# Patient Record
Sex: FEMALE | Race: WHITE | NOT HISPANIC OR LATINO | URBAN - METROPOLITAN AREA
[De-identification: names, ages, dates, MRNs, and addresses within clinical notes are randomized per-mention and may not be internally consistent; named-entity substitution may affect disease eponyms.]

---

## 2019-05-19 ENCOUNTER — INPATIENT (INPATIENT)
Facility: HOSPITAL | Age: 74
LOS: 1 days | Discharge: HOME | DRG: 69 | End: 2019-05-21
Attending: FAMILY MEDICINE | Admitting: FAMILY MEDICINE
Payer: MEDICARE

## 2019-05-19 VITALS
DIASTOLIC BLOOD PRESSURE: 69 MMHG | TEMPERATURE: 98 F | WEIGHT: 199.96 LBS | OXYGEN SATURATION: 96 % | HEART RATE: 72 BPM | RESPIRATION RATE: 14 BRPM | HEIGHT: 64 IN | SYSTOLIC BLOOD PRESSURE: 114 MMHG

## 2019-05-19 DIAGNOSIS — K58.9 IRRITABLE BOWEL SYNDROME WITHOUT DIARRHEA: ICD-10-CM

## 2019-05-19 DIAGNOSIS — E11.9 TYPE 2 DIABETES MELLITUS WITHOUT COMPLICATIONS: ICD-10-CM

## 2019-05-19 DIAGNOSIS — Z96.611 PRESENCE OF RIGHT ARTIFICIAL SHOULDER JOINT: Chronic | ICD-10-CM

## 2019-05-19 DIAGNOSIS — E03.9 HYPOTHYROIDISM, UNSPECIFIED: ICD-10-CM

## 2019-05-19 DIAGNOSIS — I10 ESSENTIAL (PRIMARY) HYPERTENSION: ICD-10-CM

## 2019-05-19 DIAGNOSIS — R53.1 WEAKNESS: ICD-10-CM

## 2019-05-19 DIAGNOSIS — Z29.9 ENCOUNTER FOR PROPHYLACTIC MEASURES, UNSPECIFIED: ICD-10-CM

## 2019-05-19 DIAGNOSIS — I63.9 CEREBRAL INFARCTION, UNSPECIFIED: ICD-10-CM

## 2019-05-19 DIAGNOSIS — Z98.891 HISTORY OF UTERINE SCAR FROM PREVIOUS SURGERY: Chronic | ICD-10-CM

## 2019-05-19 DIAGNOSIS — K21.9 GASTRO-ESOPHAGEAL REFLUX DISEASE WITHOUT ESOPHAGITIS: ICD-10-CM

## 2019-05-19 LAB
ALBUMIN SERPL ELPH-MCNC: 3.5 G/DL — SIGNIFICANT CHANGE UP (ref 3.3–5)
ALP SERPL-CCNC: 72 U/L — SIGNIFICANT CHANGE UP (ref 40–120)
ALT FLD-CCNC: 25 U/L — SIGNIFICANT CHANGE UP (ref 12–78)
AMMONIA BLD-MCNC: 29 UMOL/L — SIGNIFICANT CHANGE UP (ref 11–32)
ANION GAP SERPL CALC-SCNC: 7 MMOL/L — SIGNIFICANT CHANGE UP (ref 5–17)
APPEARANCE UR: CLEAR — SIGNIFICANT CHANGE UP
APTT BLD: 29.4 SEC — SIGNIFICANT CHANGE UP (ref 28.5–37)
AST SERPL-CCNC: 14 U/L — LOW (ref 15–37)
BILIRUB SERPL-MCNC: 0.2 MG/DL — SIGNIFICANT CHANGE UP (ref 0.2–1.2)
BILIRUB UR-MCNC: NEGATIVE — SIGNIFICANT CHANGE UP
BUN SERPL-MCNC: 39 MG/DL — HIGH (ref 7–23)
CALCIUM SERPL-MCNC: 8.8 MG/DL — SIGNIFICANT CHANGE UP (ref 8.5–10.1)
CHLORIDE SERPL-SCNC: 106 MMOL/L — SIGNIFICANT CHANGE UP (ref 96–108)
CO2 SERPL-SCNC: 29 MMOL/L — SIGNIFICANT CHANGE UP (ref 22–31)
COLOR SPEC: YELLOW — SIGNIFICANT CHANGE UP
CREAT SERPL-MCNC: 0.97 MG/DL — SIGNIFICANT CHANGE UP (ref 0.5–1.3)
DIFF PNL FLD: ABNORMAL
GLUCOSE SERPL-MCNC: 109 MG/DL — HIGH (ref 70–99)
GLUCOSE UR QL: NEGATIVE — SIGNIFICANT CHANGE UP
HCT VFR BLD CALC: 40.4 % — SIGNIFICANT CHANGE UP (ref 34.5–45)
HGB BLD-MCNC: 12.8 G/DL — SIGNIFICANT CHANGE UP (ref 11.5–15.5)
INR BLD: 0.97 RATIO — SIGNIFICANT CHANGE UP (ref 0.88–1.16)
KETONES UR-MCNC: NEGATIVE — SIGNIFICANT CHANGE UP
LEUKOCYTE ESTERASE UR-ACNC: ABNORMAL
MCHC RBC-ENTMCNC: 28.8 PG — SIGNIFICANT CHANGE UP (ref 27–34)
MCHC RBC-ENTMCNC: 31.7 GM/DL — LOW (ref 32–36)
MCV RBC AUTO: 90.8 FL — SIGNIFICANT CHANGE UP (ref 80–100)
NITRITE UR-MCNC: NEGATIVE — SIGNIFICANT CHANGE UP
NRBC # BLD: 0 /100 WBCS — SIGNIFICANT CHANGE UP (ref 0–0)
PH UR: 5 — SIGNIFICANT CHANGE UP (ref 5–8)
PLATELET # BLD AUTO: 200 K/UL — SIGNIFICANT CHANGE UP (ref 150–400)
POTASSIUM SERPL-MCNC: 4.2 MMOL/L — SIGNIFICANT CHANGE UP (ref 3.5–5.3)
POTASSIUM SERPL-SCNC: 4.2 MMOL/L — SIGNIFICANT CHANGE UP (ref 3.5–5.3)
PROT SERPL-MCNC: 6.8 G/DL — SIGNIFICANT CHANGE UP (ref 6–8.3)
PROT UR-MCNC: NEGATIVE — SIGNIFICANT CHANGE UP
PROTHROM AB SERPL-ACNC: 11.1 SEC — SIGNIFICANT CHANGE UP (ref 10–12.9)
RBC # BLD: 4.45 M/UL — SIGNIFICANT CHANGE UP (ref 3.8–5.2)
RBC # FLD: 13.9 % — SIGNIFICANT CHANGE UP (ref 10.3–14.5)
SODIUM SERPL-SCNC: 142 MMOL/L — SIGNIFICANT CHANGE UP (ref 135–145)
SP GR SPEC: 1.01 — SIGNIFICANT CHANGE UP (ref 1.01–1.02)
UROBILINOGEN FLD QL: NEGATIVE — SIGNIFICANT CHANGE UP
WBC # BLD: 8.79 K/UL — SIGNIFICANT CHANGE UP (ref 3.8–10.5)
WBC # FLD AUTO: 8.79 K/UL — SIGNIFICANT CHANGE UP (ref 3.8–10.5)

## 2019-05-19 PROCEDURE — 99223 1ST HOSP IP/OBS HIGH 75: CPT | Mod: GC,AI

## 2019-05-19 PROCEDURE — 93010 ELECTROCARDIOGRAM REPORT: CPT

## 2019-05-19 PROCEDURE — 99284 EMERGENCY DEPT VISIT MOD MDM: CPT

## 2019-05-19 PROCEDURE — 70450 CT HEAD/BRAIN W/O DYE: CPT | Mod: 26

## 2019-05-19 RX ORDER — METFORMIN HYDROCHLORIDE 850 MG/1
1 TABLET ORAL
Qty: 0 | Refills: 0 | DISCHARGE

## 2019-05-19 RX ORDER — GLIMEPIRIDE 1 MG
0 TABLET ORAL
Qty: 0 | Refills: 0 | DISCHARGE

## 2019-05-19 RX ORDER — INSULIN LISPRO 100/ML
VIAL (ML) SUBCUTANEOUS
Refills: 0 | Status: DISCONTINUED | OUTPATIENT
Start: 2019-05-19 | End: 2019-05-21

## 2019-05-19 RX ORDER — GABAPENTIN 400 MG/1
800 CAPSULE ORAL
Refills: 0 | Status: DISCONTINUED | OUTPATIENT
Start: 2019-05-19 | End: 2019-05-21

## 2019-05-19 RX ORDER — DEXTROSE 50 % IN WATER 50 %
25 SYRINGE (ML) INTRAVENOUS ONCE
Refills: 0 | Status: DISCONTINUED | OUTPATIENT
Start: 2019-05-19 | End: 2019-05-21

## 2019-05-19 RX ORDER — DEXTROSE 50 % IN WATER 50 %
15 SYRINGE (ML) INTRAVENOUS ONCE
Refills: 0 | Status: DISCONTINUED | OUTPATIENT
Start: 2019-05-19 | End: 2019-05-21

## 2019-05-19 RX ORDER — LISINOPRIL 2.5 MG/1
10 TABLET ORAL DAILY
Refills: 0 | Status: DISCONTINUED | OUTPATIENT
Start: 2019-05-19 | End: 2019-05-19

## 2019-05-19 RX ORDER — SODIUM CHLORIDE 9 MG/ML
1000 INJECTION, SOLUTION INTRAVENOUS
Refills: 0 | Status: DISCONTINUED | OUTPATIENT
Start: 2019-05-19 | End: 2019-05-21

## 2019-05-19 RX ORDER — GLUCAGON INJECTION, SOLUTION 0.5 MG/.1ML
1 INJECTION, SOLUTION SUBCUTANEOUS ONCE
Refills: 0 | Status: DISCONTINUED | OUTPATIENT
Start: 2019-05-19 | End: 2019-05-21

## 2019-05-19 RX ORDER — LISINOPRIL 2.5 MG/1
1 TABLET ORAL
Qty: 0 | Refills: 0 | DISCHARGE

## 2019-05-19 RX ORDER — ASPIRIN/CALCIUM CARB/MAGNESIUM 324 MG
81 TABLET ORAL DAILY
Refills: 0 | Status: DISCONTINUED | OUTPATIENT
Start: 2019-05-19 | End: 2019-05-21

## 2019-05-19 RX ORDER — OMEPRAZOLE 10 MG/1
1 CAPSULE, DELAYED RELEASE ORAL
Qty: 0 | Refills: 0 | DISCHARGE

## 2019-05-19 RX ORDER — LEVOTHYROXINE SODIUM 125 MCG
50 TABLET ORAL DAILY
Refills: 0 | Status: DISCONTINUED | OUTPATIENT
Start: 2019-05-19 | End: 2019-05-21

## 2019-05-19 RX ORDER — DEXTROSE 50 % IN WATER 50 %
12.5 SYRINGE (ML) INTRAVENOUS ONCE
Refills: 0 | Status: DISCONTINUED | OUTPATIENT
Start: 2019-05-19 | End: 2019-05-21

## 2019-05-19 RX ORDER — ENOXAPARIN SODIUM 100 MG/ML
40 INJECTION SUBCUTANEOUS DAILY
Refills: 0 | Status: DISCONTINUED | OUTPATIENT
Start: 2019-05-19 | End: 2019-05-19

## 2019-05-19 RX ORDER — PANTOPRAZOLE SODIUM 20 MG/1
40 TABLET, DELAYED RELEASE ORAL
Refills: 0 | Status: DISCONTINUED | OUTPATIENT
Start: 2019-05-19 | End: 2019-05-21

## 2019-05-19 RX ORDER — FUROSEMIDE 40 MG
20 TABLET ORAL DAILY
Refills: 0 | Status: DISCONTINUED | OUTPATIENT
Start: 2019-05-19 | End: 2019-05-21

## 2019-05-19 RX ORDER — GABAPENTIN 400 MG/1
0 CAPSULE ORAL
Qty: 0 | Refills: 0 | DISCHARGE

## 2019-05-19 RX ORDER — LEVOTHYROXINE SODIUM 125 MCG
1 TABLET ORAL
Qty: 0 | Refills: 0 | DISCHARGE

## 2019-05-19 RX ORDER — SIMVASTATIN 20 MG/1
40 TABLET, FILM COATED ORAL AT BEDTIME
Refills: 0 | Status: DISCONTINUED | OUTPATIENT
Start: 2019-05-19 | End: 2019-05-21

## 2019-05-19 RX ORDER — IBUPROFEN 200 MG
400 TABLET ORAL ONCE
Refills: 0 | Status: COMPLETED | OUTPATIENT
Start: 2019-05-19 | End: 2019-05-19

## 2019-05-19 RX ORDER — INSULIN LISPRO 100/ML
VIAL (ML) SUBCUTANEOUS AT BEDTIME
Refills: 0 | Status: DISCONTINUED | OUTPATIENT
Start: 2019-05-19 | End: 2019-05-21

## 2019-05-19 RX ADMIN — SIMVASTATIN 40 MILLIGRAM(S): 20 TABLET, FILM COATED ORAL at 22:10

## 2019-05-19 RX ADMIN — GABAPENTIN 800 MILLIGRAM(S): 400 CAPSULE ORAL at 22:10

## 2019-05-19 RX ADMIN — Medication 400 MILLIGRAM(S): at 23:02

## 2019-05-19 RX ADMIN — Medication 400 MILLIGRAM(S): at 22:10

## 2019-05-19 NOTE — PATIENT PROFILE ADULT - NSPROGENARRIVEDFROM_GEN_A_NUR
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  Continuing to have some discomfort with stretching and strengthening, and notes a feeling of buckling when ambulating longer distances.  Overall doing well.     Current Pain (0-10 scale):   Functional Change: see above    OBJECTIVE   R knee ROM   0-5-120    After treatment: 0-2-123  Gait: - without assistive device   Circumduction right lower extremity, decreased with visual cuing of mirror    Treatment   TherEx/NeuroRed/TherAct:  -recumbent bike to improve ROM, starting seat at 12 progressing to 11 - patient performed independently (unbilled)    Seated:   *Long arc quads x 10         DL Leg press (seat 3) 4 plates 2x10 with red thera-band promotion hip abduction/external rotation    Single leg 3 plates 3 x 5 with cuing for quadricept activation    Stretch board 4 x :30 for increased knee extension    6\" step up alternating with visual cuing to promote equal mechanics bilaterally   2 x 15    Lateral over and backs blue janeth with decreasing use of upper extremities 2 x 10     Star excursion alternating stepping directions with use of 1 upper extremity support on [facility] ambulation without assistive device - utilized mirror for visualization to decrease right hip external rotation/circumduction compensation 4 x 100'             Manual Therapy:  Knee extension ROM with end range oscillation  Femoral posterior glides with proximal tibia blocked - grade III/IV   Posterior tibial  mobilizations - grade III   soft tissue mobilization quadriceps muscle      Skilled input: as noted above    Vasopneumatic Compression / Game Ready ([id number]):  Location: R knee  Position: supine with leg(s) elevated Compression: low  Water temperature 34 °F  Duration: 15 minutes   Results: decreased pain; no adverse reaction to treatment    Home Program:   12/31/18-  · Supine passive knee extension  · SAQ  1/3/2019  -heel raises 2 x 10, 3-4 x per day with upper extremities support bilaterally    1/7/2019   Standing hip abduction 2 x 10, 3-4 times per day    1/16/2019   Prone knee extension OR standing hamstring stretch OR supine passive knee extension 3 x per day  Cameron stretch 2 x :30  Long arc quads 2 x 10      Writer verbally educated the patient and received verbal consent from the patient on hand placement, positioning of patient, and techniques to be performed today including palpation/hand placement on the RLE and how they are pertinent to the patient's plan of care.      Suggestions for next session as indicated: progress per plan of care, progress ROM and functional strengthening in weightbearing, progress balance and ambulation without assistive device     ASSESSMENT   Patient tolerated session well without immediate adverse reaction. Patient continuing to demonstrate decreased ROM in end range flexion and extension, but improves with manual therapy.  Tolerated increased functional strengthening this session and demonstrated decreased step up compensation with visual cuing.  Patient continues to benefit from skilled therapy to continue progressing toward established goals and restore patient to optimal level of function.     Pain after treatment (patient reported, 0-10 scale): 2  Result of above outlined education: Verbalizes understanding and Needs reinforcement    THERAPY DAILY BILLING   Insurance: [employer] 2. N/A    Evaluation Procedures:  No evaluation codes were used on this date of  service    Timed Procedures:  Manual Therapy, 10 minutes  Therapeutic Activity, 20 minutes  Therapeutic Exercise, 10 minutes    Untimed Procedures:  Vasopneumatic Device    Total Treatment Time: 55 minutes         sent via ambulance from [facility]

## 2019-05-19 NOTE — H&P ADULT - NSHPPHYSICALEXAM_GEN_ALL_CORE
Physical Exam:  General: Well developed, well nourished, NAD  HEENT: NCAT, PERRLA, EOMI bl, moist mucous membranes   Neck: Supple, nontender, no mass  Neurology: A&Ox3, nonfocal, CN II-XII grossly intact, sensation intact, no gait abnormalities   Respiratory: CTA B/L, No W/R/R  CV: RRR, +S1/S2, no murmurs, rubs or gallops  Abdominal: Soft, NT, ND +BSx4  Extremities: No C/C/E, + peripheral pulses  MSK: Normal ROM, no joint erythema or warmth, no joint swelling   Skin: warm, dry, normal color, no rash or abnormal lesions Physical Exam:  General: obese female, no acute distress, appropriate affect  HEENT: normocephalic atraumatic, PERRLA, EOMI b/l, moist mucous membranes   Neck: supple, nontender, no mass  Neurology: AOx3, CN II-XII grossly intact, strength intact UE and LE b/l, decreased sensation LE b/l.  Respiratory: clear to auscultation B/L, no wheezes, rales, or rhonchi  CV: regular rate and rhythm, no murmurs, rubs or gallops  Abdominal: obese, soft, non-tender, non-distended, positive bowel sounds x4  Extremities: trace LE edema, No clubbing, cyanosis positive peripheral pulses  Musculoskeletal: normal range of motion, no joint erythema or warmth, no joint swelling   Skin: warm, dry, normal color, well perfused

## 2019-05-19 NOTE — ED PROVIDER NOTE - NEUROLOGICAL, MLM
No focal motor or sensory deficits.  Truncal ataxia. Choosing wrong words. Rotary/right horizontal nystagmus.   NIH=1 Dysphagia Screen=Passed

## 2019-05-19 NOTE — H&P ADULT - PROBLEM SELECTOR PLAN 6
-Padua: 2  -DVT ppx: SCD for DVT ppx, for 24 hours to reduce nish of hemorrhagic conversion, given concern for CVA, can start pharmacologic ppx tomorrow -continue PP1 -continue PPI

## 2019-05-19 NOTE — H&P ADULT - HISTORY OF PRESENT ILLNESS
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     In the ED, patient's vitals were: T97.7F, Hr 72, /69, RR 14 and SpO2 96% on rom air. Orthostatic negative. CBC and CMP wnl.   CT head showed No evidence of acute hemorrhage mass or mass effect.  EKG: 73 yo female with PMH of T2DM, Hypothyroid, HTN and Aneurysm (?base of skull/upper neck), hypothyroid, and IBS who presents with altered mental status and weakness. According to patient and family at bedside the patient went for walk this morning and when she returned when family noted that she was lethargic. The patient also states that she was sleeping on the couch, was startled by her son, stood up and felt weak thereafter. Her son also noted that she seemed to be using the wrong words. The patient denies hx of orthostatic BP changes, vertigo, seizures, previous stroke or arrhythmia.    In the ED, patient's vitals were: T97.7F, Hr 72, /69, RR 14 and SpO2 96% on rom air. Orthostatic negative. CBC and CMP wnl.   CT head showed No evidence of acute hemorrhage mass or mass effect.  EKG: NSR 73 yo female with PMH of T2DM, Hypothyroid, HTN and Aneurysm (?base of skull/upper neck), hypothyroid, and IBS who presents with altered mental status and weakness. According to patient and family at bedside the patient went for walk this morning and when she returned when family noted that she was lethargic. The patient also states that she was sleeping on the couch, was startled by her son, stood up and felt weak thereafter. Her son also noted that she seemed to be mixing up her words. The patient denies hx of orthostatic BP changes, vertigo, seizures, previous stroke or arrhythmia, trauma, recent illness, or sick contacts.    In the ED, patient's vitals were: T97.7F, Hr 72, /69, RR 14 and SpO2 96% on rom air. Orthostatic negative. CBC and CMP wnl.   CT head showed No evidence of acute hemorrhage mass or mass effect.  EKG: NSR

## 2019-05-19 NOTE — H&P ADULT - PROBLEM SELECTOR PLAN 5
IMPROVE VTE Individual Risk Assessment          RISK                                                          Points  [  ] Previous VTE                                                3  [  ] Thrombophilia                                             2  [  ] Lower limb paralysis                                   2        (unable to hold up >15 seconds)    [  ] Current Cancer                                             2         (within 6 months)  [  ] Immobilization > 24 hrs                              1  [  ] ICU/CCU stay > 24 hours                             1  [  ] Age > 60                                                         1    IMPROVE VTE Score:   dvt ppx: -patient on home dicyclomine PRN for IBS, will hold for now as patient asymptomatic

## 2019-05-19 NOTE — ED ADULT NURSE NOTE - OBJECTIVE STATEMENT
Patient sent in by family for sudden onset lethargy and unsteady gait.  Patient is mildly confused, slow mentition.

## 2019-05-19 NOTE — ED ADULT NURSE REASSESSMENT NOTE - NS ED NURSE REASSESS COMMENT FT1
After repositioning pt in bed, pt reports sudden headache 5/10 with sudden vision change. Pt describes vision change as unable to focus clearly. /59, HR 66. MD Gibson called and made aware of pt's complaints. Per MD, pt okay to go up to telemetry floor at this time, MD will evaluate when pt gets up to room. Pt reports vision changes are improving. Inpatient RN Renata made aware of situation.
CT Tech Garrett called department to say that patient told him she is allergic to IV Contrast and requires premedication.  Dr. Buenrostro informed of this fact.  Orders will be changed to CT head non-contrast only.  Allergy entered and bracelet in place.

## 2019-05-19 NOTE — H&P ADULT - ASSESSMENT
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 Admitted to telemetry to rule out CVA.

## 2019-05-19 NOTE — H&P ADULT - PROBLEM SELECTOR PLAN 3
Stable  - Continue Lisinopril 10mg qd Stable  - hold home Lisinopril 10mg qd  - hold home furosemide 20 mg qd

## 2019-05-19 NOTE — ED ADULT NURSE NOTE - ILLNESS RECENT EXPOSURE
Patient Instructions by Travis Baldwin DO at 09/27/18 04:50 PM     Author:  Travis Baldwin DO Service:  (none) Author Type:  Physician     Filed:  09/27/18 04:50 PM Encounter Date:  9/27/2018 Status:  Signed     :  Travis Baldwin DO (Physician)              ASSESSMENT:/PLAN:  Bilateral neck pain-- likely muscular     Discussed options such as tylenol, codeine pain meds vs muscle relaxant--- at this point will use tylenol over the counter as needed and warm moist compresses to area as needed.    Avoid any motrin/ advil or aleve products.    Recheck with your doctor if persisting pains.  BUT IF ONSET OF CHEST PAIN , SHORTNESS OF BREATH OR NEW CONCERNS THEN GO TO ER            Revision History        User Key Date/Time User Provider Type Action    > [N/A] 09/27/18 04:50 PM Travis Baldwin DO Physician Sign            
None known

## 2019-05-19 NOTE — H&P ADULT - NSICDXPASTSURGICALHX_GEN_ALL_CORE_FT
PAST SURGICAL HISTORY:  No significant past surgical history PAST SURGICAL HISTORY:  S/P  x4    S/P shoulder replacement, right

## 2019-05-19 NOTE — H&P ADULT - PROBLEM SELECTOR PLAN 2
Hold home medications of metformin 1000 mg bid and Glimeperide 2mg bid  - Check HgA1c  - Start insulin sliding scale  - accuchecks  - DASH/CC diet Hold home medications of metformin 1000 mg bid and Glimeperide 2mg bid  - A1C 2 weeks prior as outpatient 6.9  - Start insulin sliding scale, low dose  - accuchecks, hypoglycemia protocol  - DASH/CCarb diet Hold home medications of metformin 1000 mg bid and Glimeperide 2mg bid  - A1C 2 weeks prior as outpatient 6.9  - Start insulin sliding scale, low dose  - accuchecks, hypoglycemia protocol  - DASH/CCarb diet  - hx of diabetic neuropathy, patient gets foot checks q 3 months  - continue gabapentin 1600 mg BID

## 2019-05-19 NOTE — ED ADULT NURSE NOTE - NSIMPLEMENTINTERV_GEN_ALL_ED
Implemented All Universal Safety Interventions:  [place] to call system. Call bell, personal items and telephone within reach. Instruct patient to call for assistance. Room bathroom lighting operational. Non-slip footwear when patient is off stretcher. Physically safe environment: no spills, clutter or unnecessary equipment. Stretcher in lowest position, wheels locked, appropriate side rails in place.

## 2019-05-19 NOTE — CHART NOTE - NSCHARTNOTEFT_GEN_A_CORE
Called by RN for headache 5/10 and vision out of focus.    Pt seen and examined at bedside. States her vision is now back to normal but that she had been repositioned and her head was tilted back and when she came back upright her vision was out of focus for about 10 minutes. Denies loss of vision. Pt states her headache originates from the back of the neck and the base of the skull and goes up to the top of her head. She also describes a frontal and temporal headache. She states she feels it is stress related and rates it a 5/10. She normally takes advil for headaches.     Vital Signs (24 Hrs):  T(C): 36.6 (05-19-19 @ 19:52), Max: 36.6 (05-19-19 @ 19:52)  HR: 66 (05-19-19 @ 20:14) (63 - 72)  BP: 131/59 (05-19-19 @ 20:14) (114/69 - 143/64)  RR: 16 (05-19-19 @ 20:14) (14 - 16)  SpO2: 97% (05-19-19 @ 20:14) (96% - 97%)    Gen: well-developed, appears stated age, in NAD sitting comfortably in bed  HEENT: NC/AT, sclera anicteric, PERRL, EOMI, neck supple  Neuro: no focal deficits, speech fluent and appropriate, no facial asymmetry, moves all extremities   Psych: normal affect    A/P: 73 yo female with PMH of T2DM, Hypothyroid, HTN and Aneurysm (?base of skull/upper neck), hypothyroid, and IBS admitted with weakness and AMS for R/O CVA. CTH negative for bleed or mass effect. Headache likely a tension headache.    -MRI/MRA and ECHO already ordered for the AM.   -Will give ibuprofen 400mg once, now.  - RN to call with any changes

## 2019-05-19 NOTE — H&P ADULT - PROBLEM SELECTOR PLAN 7
-Padua: 2  -DVT ppx: SCD for DVT ppx, for 24 hours to reduce nish of hemorrhagic conversion, given concern for CVA, can start pharmacologic ppx tomorrow

## 2019-05-19 NOTE — ED PROVIDER NOTE - OBJECTIVE STATEMENT
73 yo white female with H/O DM, HTN and Aneurysm "high in neck" who awoke this morning feeling weak and fatigued. Went for walk early i.e 10 am and when returned was noted to be slightly sleepy and also was noted to be using wrong words. In addition had altered gait, falling backwards. No LOC, no falls, no chest pain, no headache and no nausea, vomiting, fever or chills. Son called EMS and transported patient here for further evaluation and management.

## 2019-05-19 NOTE — H&P ADULT - NSICDXPASTMEDICALHX_GEN_ALL_CORE_FT
PAST MEDICAL HISTORY:  Aneurysm artery, neck     Diabetes mellitus     Hypertension PAST MEDICAL HISTORY:  Aneurysm artery, neck     Diabetes mellitus     Hypertension     Hypothyroidism     IBS (irritable bowel syndrome)

## 2019-05-19 NOTE — ED ADULT TRIAGE NOTE - CHIEF COMPLAINT QUOTE
"I'm just tired."  pt c/o weakness and general malaise; was seen at urgent care and sent to ED for eval

## 2019-05-19 NOTE — H&P ADULT - PROBLEM SELECTOR PLAN 1
Admit to telemetry. Rule out CVA  - CT head negative  - Check MRI  - Check ECHO  - Check TSH, ammonia, lipid profile  - check ua, blood and urine cultures  - PT eval  - Neuro consulted- Dr. Cohen Admit to telemetry. Rule out CVA  - CT head negative (noncon, patient has iodine contrast allergy, angio edema)  - Check MRI/MRA  - Check ECHO  - Check TSH, ammonia, lipid profile  - check ua, blood and urine cultures  - PT eval  - Neuro consulted- Dr. Cohen  - simvastatin 40 mg started  - ASA 81 started  - will recheck orthostatics

## 2019-05-19 NOTE — H&P ADULT - NSHPREVIEWOFSYSTEMS_GEN_ALL_CORE
Constitutional: denies fever, chills, general malaise, weight loss, weight gain, diaphoresis   HEENT: denies sore throat, runny nose, photophobia, blurry vision, double vision, eye pain, difficulty hearing, dizziness  Respiratory: SCOTT, DRY COUGH, denies shortness of breath, wheezing, hemoptysis  Cardiovascular: LE EDEMA, denies chest pain, palpitations  Gastrointestinal: denies nausea, vomiting, diarrhea, constipation, abdominal pain, melena, hematochezia   Genitourinary: denies dysuria, hematuria, frequency, urgency, incontinence  Skin/Breast: denies rash, hives, itching  Musculoskeletal: LBP, denies muscle weakness, joint pain or swelling  Neurologic: LE NEUROPATHY/NUMBNESS, WEAKNESS, denies syncope, loss of consciousness, dizziness, paresthesias, confusion, dementia   Hematology/Oncology: denies abnormal bruising, tender or enlarged lymph nodes

## 2019-05-20 LAB
ANION GAP SERPL CALC-SCNC: 8 MMOL/L — SIGNIFICANT CHANGE UP (ref 5–17)
BUN SERPL-MCNC: 36 MG/DL — HIGH (ref 7–23)
CALCIUM SERPL-MCNC: 8.4 MG/DL — LOW (ref 8.5–10.1)
CHLORIDE SERPL-SCNC: 108 MMOL/L — SIGNIFICANT CHANGE UP (ref 96–108)
CHOLEST SERPL-MCNC: 130 MG/DL — SIGNIFICANT CHANGE UP (ref 10–199)
CO2 SERPL-SCNC: 28 MMOL/L — SIGNIFICANT CHANGE UP (ref 22–31)
CREAT SERPL-MCNC: 0.9 MG/DL — SIGNIFICANT CHANGE UP (ref 0.5–1.3)
CULTURE RESULTS: SIGNIFICANT CHANGE UP
GLUCOSE SERPL-MCNC: 91 MG/DL — SIGNIFICANT CHANGE UP (ref 70–99)
HCT VFR BLD CALC: 37.4 % — SIGNIFICANT CHANGE UP (ref 34.5–45)
HDLC SERPL-MCNC: 44 MG/DL — LOW
HGB BLD-MCNC: 11.8 G/DL — SIGNIFICANT CHANGE UP (ref 11.5–15.5)
LIPID PNL WITH DIRECT LDL SERPL: 55 MG/DL — SIGNIFICANT CHANGE UP
MCHC RBC-ENTMCNC: 28.9 PG — SIGNIFICANT CHANGE UP (ref 27–34)
MCHC RBC-ENTMCNC: 31.6 GM/DL — LOW (ref 32–36)
MCV RBC AUTO: 91.4 FL — SIGNIFICANT CHANGE UP (ref 80–100)
NRBC # BLD: 0 /100 WBCS — SIGNIFICANT CHANGE UP (ref 0–0)
PLATELET # BLD AUTO: 192 K/UL — SIGNIFICANT CHANGE UP (ref 150–400)
POTASSIUM SERPL-MCNC: 4.5 MMOL/L — SIGNIFICANT CHANGE UP (ref 3.5–5.3)
POTASSIUM SERPL-SCNC: 4.5 MMOL/L — SIGNIFICANT CHANGE UP (ref 3.5–5.3)
RBC # BLD: 4.09 M/UL — SIGNIFICANT CHANGE UP (ref 3.8–5.2)
RBC # FLD: 14.2 % — SIGNIFICANT CHANGE UP (ref 10.3–14.5)
SODIUM SERPL-SCNC: 144 MMOL/L — SIGNIFICANT CHANGE UP (ref 135–145)
SPECIMEN SOURCE: SIGNIFICANT CHANGE UP
TOTAL CHOLESTEROL/HDL RATIO MEASUREMENT: 3 RATIO — LOW (ref 3.3–7.1)
TRIGL SERPL-MCNC: 155 MG/DL — HIGH (ref 10–149)
TSH SERPL-MCNC: 1.54 UIU/ML — SIGNIFICANT CHANGE UP (ref 0.36–3.74)
WBC # BLD: 7.66 K/UL — SIGNIFICANT CHANGE UP (ref 3.8–10.5)
WBC # FLD AUTO: 7.66 K/UL — SIGNIFICANT CHANGE UP (ref 3.8–10.5)

## 2019-05-20 PROCEDURE — 70551 MRI BRAIN STEM W/O DYE: CPT | Mod: 26

## 2019-05-20 PROCEDURE — 93880 EXTRACRANIAL BILAT STUDY: CPT | Mod: 26

## 2019-05-20 PROCEDURE — 99233 SBSQ HOSP IP/OBS HIGH 50: CPT | Mod: GC

## 2019-05-20 PROCEDURE — 70544 MR ANGIOGRAPHY HEAD W/O DYE: CPT | Mod: 26,59

## 2019-05-20 PROCEDURE — 99222 1ST HOSP IP/OBS MODERATE 55: CPT

## 2019-05-20 RX ORDER — ASPIRIN/CALCIUM CARB/MAGNESIUM 324 MG
1 TABLET ORAL
Qty: 30 | Refills: 0
Start: 2019-05-20 | End: 2019-06-18

## 2019-05-20 RX ORDER — ENOXAPARIN SODIUM 100 MG/ML
40 INJECTION SUBCUTANEOUS DAILY
Refills: 0 | Status: DISCONTINUED | OUTPATIENT
Start: 2019-05-20 | End: 2019-05-21

## 2019-05-20 RX ORDER — IBUPROFEN 200 MG
400 TABLET ORAL ONCE
Refills: 0 | Status: COMPLETED | OUTPATIENT
Start: 2019-05-20 | End: 2019-05-20

## 2019-05-20 RX ORDER — SIMVASTATIN 20 MG/1
1 TABLET, FILM COATED ORAL
Qty: 30 | Refills: 0
Start: 2019-05-20 | End: 2019-06-18

## 2019-05-20 RX ORDER — VENLAFAXINE HCL 75 MG
75 CAPSULE, EXT RELEASE 24 HR ORAL ONCE
Refills: 0 | Status: COMPLETED | OUTPATIENT
Start: 2019-05-20 | End: 2019-05-20

## 2019-05-20 RX ADMIN — Medication 81 MILLIGRAM(S): at 12:22

## 2019-05-20 RX ADMIN — Medication 75 MILLIGRAM(S): at 08:39

## 2019-05-20 RX ADMIN — SIMVASTATIN 40 MILLIGRAM(S): 20 TABLET, FILM COATED ORAL at 20:33

## 2019-05-20 RX ADMIN — Medication 400 MILLIGRAM(S): at 21:30

## 2019-05-20 RX ADMIN — Medication 400 MILLIGRAM(S): at 15:55

## 2019-05-20 RX ADMIN — Medication 20 MILLIGRAM(S): at 06:21

## 2019-05-20 RX ADMIN — Medication 50 MICROGRAM(S): at 06:21

## 2019-05-20 RX ADMIN — Medication 400 MILLIGRAM(S): at 20:33

## 2019-05-20 RX ADMIN — ENOXAPARIN SODIUM 40 MILLIGRAM(S): 100 INJECTION SUBCUTANEOUS at 17:41

## 2019-05-20 RX ADMIN — GABAPENTIN 800 MILLIGRAM(S): 400 CAPSULE ORAL at 17:24

## 2019-05-20 RX ADMIN — PANTOPRAZOLE SODIUM 40 MILLIGRAM(S): 20 TABLET, DELAYED RELEASE ORAL at 06:21

## 2019-05-20 RX ADMIN — GABAPENTIN 800 MILLIGRAM(S): 400 CAPSULE ORAL at 06:21

## 2019-05-20 RX ADMIN — Medication 400 MILLIGRAM(S): at 14:55

## 2019-05-20 NOTE — PROGRESS NOTE ADULT - PROBLEM SELECTOR PLAN 1
-Admit to telemetry. Rule out CVA  -CT head negative (noncon, patient has iodine contrast allergy, angio edema)  -f/u MRI/MRA  -f/u carotid toppler  -f/u echo  -TSH wnl, ammonia wnl, out patient A1c 6.9 two weeks prior as per pt,  -f/u lipid profile  -UA with trace LE and mod blood, but patient asymptomatic  -f/u blood and urine cultures  -PT eval  -Neuro consulted- Dr. Cohen  -simvastatin 40 mg started  -ASA 81 started  -will recheck orthostatics -Rule out CVA  possible tia this time as per neuro jones   -CT head negative (noncon, patient has iodine contrast allergy, angio edema)  -MRI/MRA no acute cva noticed   - carotid toppler  no acute stenosis   -f/u echo  -TSH wnl, ammonia wnl, out patient A1c 6.9 two weeks prior as per pt,  -f/u lipid profile  -UA with trace LE and mod blood, but patient asymptomatic  -PT eval  -Neuro consulted- Dr. Cohen  -simvastatin 40 mg started  -ASA 81 started  -will recheck orthostatics

## 2019-05-20 NOTE — DISCHARGE NOTE NURSING/CASE MANAGEMENT/SOCIAL WORK - NSDCFUADDAPPT_GEN_ALL_CORE_FT
pt need fu out pt neuro with  1 to 2 wk as   hx of  brain aneurysm but mra  of brain today does no show  it  .    no flying for 1 week .     your are started new meds  ZOCOR for  prevention of future  cva -   fu repeat  lipid profile and lft  with this meds  in 2 wk  closely .  echo result to fu out pt.

## 2019-05-20 NOTE — CONSULT NOTE ADULT - ASSESSMENT
Clinical impression is possible TIA vs TIA like symptoms dehydration rule out CVA.  I would recommend telemetry evaluation to rule out underlying arrhythmia.  Cardiac enzymes.  Echocardiogram.  The patient has been started on aspirin, we would recommend for now to continue that.  Continue the patient on statin.  carotid Doppler.  We will plan for MRI and MRA of the brain.  no flying for 1 week   spoke to son

## 2019-05-20 NOTE — DISCHARGE NOTE NURSING/CASE MANAGEMENT/SOCIAL WORK - NSDCDPATPORTLINK_GEN_ALL_CORE
You can access the [facility] Patient Portal, offered by [facility], by registering with the following website: [email or web address]

## 2019-05-20 NOTE — PHYSICAL THERAPY INITIAL EVALUATION ADULT - ADDITIONAL COMMENTS
Pt lives alone in an apt in  [place], no steps. Pt is an independent ambulator with RW or SC and is independent with ADLs- drives locally. Upon d/c, pt will be staying at her son's house, + 3 steps to enter/exit only

## 2019-05-20 NOTE — PROGRESS NOTE ADULT - PROBLEM SELECTOR PLAN 2
-Hold home medications of metformin 1000 mg bid and Glimeperide 2mg bid  -A1C 2 weeks prior as outpatient 6.9  -Start insulin sliding scale, low dose  -accuchecks, hypoglycemia protocol  -DASH/CCarb diet  -hx of diabetic neuropathy, patient gets foot checks q 3 months  -continue gabapentin 1600 mg BID -Hold home medications of metformin 1000 mg bid and Glimepiride 2mg bid  -A1C 2 weeks prior as outpatient 6.9  -Start insulin sliding scale, low dose  -accuchecks, hypoglycemia protocol  -DASH/CCarb diet  -hx of diabetic neuropathy, patient gets foot checks q 3 months  -continue gabapentin 1600 mg BID

## 2019-05-20 NOTE — CONSULT NOTE ADULT - ASSESSMENT
74F with PMH of IBS, DM type 2, hypothyroidism, HTN and possible hx of aneurysm presents with AMS and weakness admitted for r/o TIA/CVA.     - Workup negative for CVA. No aneurysm noted on MRA head.   - EKG unremarkable. No events on telemetry.    - ECHO results pending.   - BP stable, ok to restart lisinopril.   - Monitor and replete lytes, keep K>4, Mg>2  - Other cardiovascular workup will depend on clinical course.  - All other workup per primary team  - Would recommend outpatient stress test 74F with PMH of IBS, DM type 2, hypothyroidism, HTN and possible hx of aneurysm presents with AMS and weakness admitted for r/o TIA/CVA.     - Workup negative for CVA. No aneurysm noted on MRA head.   - EKG unremarkable. No events on telemetry.    - ECHO results pending.   - Continue aspirin and statin  - BP stable, ok to restart lisinopril   - Monitor and replete lytes, keep K>4, Mg>2  - Other cardiovascular workup will depend on clinical course.  - All other workup per primary team  - Would recommend outpatient stress test     INCOMPLETE 74F with PMH of IBS, DM type 2, hypothyroidism, HTN and possible hx of cerebral aneurysm presents with AMS and weakness admitted for r/o TIA/CVA.     - Workup negative for CVA. No aneurysm noted on MRA head.   - EKG unremarkable. No events on telemetry.    - Prelim echo with normal LV function.     - Continue aspirin and statin    - BP stable, ok to restart lisinopril     - Monitor and replete lytes, keep K>4, Mg>2    - Other cardiovascular workup will depend on clinical course.  - All other workup per primary team  - If dc'd can followup with cardiologist in [place] for extended monitoring and possible ischemic evaluation.

## 2019-05-20 NOTE — PROGRESS NOTE ADULT - ASSESSMENT
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 Admitted to telemetry to rule out CVA. 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 Admitted to telemetry to tia   rule out CVA.

## 2019-05-20 NOTE — DISCHARGE NOTE PROVIDER - PROVIDER TOKENS
FREE:[LAST:[Kasia],FIRST:[Amalia Mccormick],PHONE:[phone]],FAX:[(   )    -],FOLLOWUP:[2 weeks]] FREE:[LAST:[Debarry],FIRST:[Amalia Mccormick],PHONE:[phone]],FAX:[(   )    -],FOLLOWUP:[2 weeks]],PROVIDER:[TOKEN:[[id number]]]

## 2019-05-20 NOTE — PROGRESS NOTE ADULT - ATTENDING COMMENTS
pt seen and examine see above plan -  presents with altered mental status and weakness sec to possible tia - on asa , statin   fu echo result , pt evaluation   nih score 0 , no flying for 1 week .

## 2019-05-20 NOTE — DISCHARGE NOTE PROVIDER - HOSPITAL COURSE
FROM ADMISSION H+P:     HPI:    75 yo female with PMH of T2DM, Hypothyroid, HTN and Aneurysm (?base of skull/upper neck), hypothyroid, and IBS who presents with altered mental status and weakness. According to patient and family at bedside the patient went for walk this morning and when she returned when family noted that she was lethargic. The patient also states that she was sleeping on the couch, was startled by her son, stood up and felt weak thereafter. Her son also noted that she seemed to be mixing up her words. The patient denies hx of orthostatic BP changes, vertigo, seizures, previous stroke or arrhythmia, trauma, recent illness, or sick contacts.        In the ED, patient's vitals were: T97.7F, Hr 72, /69, RR 14 and SpO2 96% on rom air. Orthostatic negative. CBC and CMP wnl.     CT head showed No evidence of acute hemorrhage mass or mass effect.    EKG: NSR (19 May 2019 17:33)            ---    HOSPITAL COURSE: She was admitted for weakness likely 2/2 TIA vs. r/o CVA vs. orthostatic hypotension. CT head negative. 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 A differential includes an area of dysplasia versus a low level inflammatory lesion. Postcontrast imaging is recommended for further  assessment.2)  scattered chronic ischemic changes in both hemispheres with volume loss. Neuro, Dr. Cohen, recommended outpatient MRI with contrast but patient has iodine allergy. Her TSH was 1.54, HgA1c 6.4 done 2 weeks ago, Lipid panel with , ratio 3.0. Cardio, Dr. Westfall, to read TTE which showed _____. Carotid dopplers were negative for any significant stenosis. The patient should not be flying for 1 week. She was started on Aspirin and her statin was continued. Her anti-hypertensives were held to allow for permissive hypertension and resumed????         ---    CONSULTANTS:     Neuro: Dr. Cohen    Cardio: Dr. Westfall     --- FROM ADMISSION H+P:     HPI:    75 yo female with PMH of T2DM, Hypothyroid, HTN and Aneurysm (?base of skull/upper neck), hypothyroid, and IBS who presents with altered mental status and weakness. According to patient and family at bedside the patient went for walk this morning and when she returned when family noted that she was lethargic. The patient also states that she was sleeping on the couch, was startled by her son, stood up and felt weak thereafter. Her son also noted that she seemed to be mixing up her words. The patient denies hx of orthostatic BP changes, vertigo, seizures, previous stroke or arrhythmia, trauma, recent illness, or sick contacts.        In the ED, patient's vitals were: T97.7F, Hr 72, /69, RR 14 and SpO2 96% on rom air. Orthostatic negative. CBC and CMP wnl.     CT head showed No evidence of acute hemorrhage mass or mass effect.    EKG: NSR (19 May 2019 17:33)            ---    HOSPITAL COURSE: She was admitted for weakness likely 2/2 TIA vs. r/o CVA vs. orthostatic hypotension. CT head negative. 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 A differential includes an area of dysplasia versus a low level inflammatory lesion. Postcontrast imaging is recommended for further  assessment.2)  scattered chronic ischemic changes in both hemispheres with volume loss. Neuro, Dr. Cohen, recommended outpatient MRI with contrast but patient has iodine allergy. Her TSH was 1.54, HgA1c 6.4 done 2 weeks ago, Lipid panel with , ratio 3.0. Cardio, Dr. Westfall, to read TTE   done .     Carotid dopplers were negative for any significant stenosis.     The patient should not be flying for 1 week.  pt  symptom resolved  this time ruled out cva  only tia  is diagnosis ,  She was started on Aspirin and her statin   and dvt prophy lovenox was continued. Her anti-hypertensives were held to allow for permissive hypertension and resumed bp meds at [facility].        ---    CONSULTANTS:     Neuro: Dr. Cohen    Cardio: Dr. Westfall     --- FROM ADMISSION H+P:     HPI:    75 yo female with PMH of T2DM, Hypothyroid, HTN and Aneurysm (?base of skull/upper neck), hypothyroid, and IBS who presents with altered mental status and weakness. According to patient and family at bedside the patient went for walk this morning and when she returned when family noted that she was lethargic. The patient also states that she was sleeping on the couch, was startled by her son, stood up and felt weak thereafter. Her son also noted that she seemed to be mixing up her words. The patient denies hx of orthostatic BP changes, vertigo, seizures, previous stroke or arrhythmia, trauma, recent illness, or sick contacts.        In the ED, patient's vitals were: T97.7F, Hr 72, /69, RR 14 and SpO2 96% on rom air. Orthostatic negative. CBC and CMP wnl.     CT head showed No evidence of acute hemorrhage mass or mass effect.    EKG: NSR (19 May 2019 17:33)            ---    HOSPITAL COURSE: She was admitted for weakness likely 2/2 TIA vs. r/o CVA vs. orthostatic hypotension. CT head negative. 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 A differential includes an area of dysplasia versus a low level inflammatory lesion. Postcontrast imaging is recommended for further  assessment.2)  scattered chronic ischemic changes in both hemispheres with volume loss. MRA head was unremarkable. Neuro, Dr. Cohen, recommended outpatient MRI with contrast but patient has iodine allergy. Her TSH was 1.54, HgA1c 6.4 done 2 weeks ago, Lipid panel with , ratio 3.0. Cardio, Dr. Westfall, to read TTE   done .     Carotid dopplers were negative for any significant stenosis.     The patient should not be flying for 1 week.  pt  symptom resolved  this time ruled out cva  only tia  is diagnosis ,  She was started on Aspirin and her statin   and dvt prophy lovenox was continued. Her anti-hypertensives were held to allow for permissive hypertension and resumed bp meds at dc.     PT recommended ____.         ---    CONSULTANTS:     Neuro: Dr. Cohen    Cardio: Dr. Westfall     --- FROM ADMISSION H+P:     HPI:    75 yo female with PMH of T2DM, Hypothyroid, HTN and Aneurysm (?base of skull/upper neck), hypothyroid, and IBS who presents with altered mental status and weakness. According to patient and family at bedside the patient went for walk this morning and when she returned when family noted that she was lethargic. The patient also states that she was sleeping on the couch, was startled by her son, stood up and felt weak thereafter. Her son also noted that she seemed to be mixing up her words. The patient denies hx of orthostatic BP changes, vertigo, seizures, previous stroke or arrhythmia, trauma, recent illness, or sick contacts.        In the ED, patient's vitals were: T97.7F, Hr 72, /69, RR 14 and SpO2 96% on rom air. Orthostatic negative. CBC and CMP wnl.     CT head showed No evidence of acute hemorrhage mass or mass effect.    EKG: NSR (19 May 2019 17:33)            ---    HOSPITAL COURSE: She was admitted for weakness likely 2/2 TIA vs. r/o CVA vs. orthostatic hypotension. CT head negative. 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 A differential includes an area of dysplasia versus a low level inflammatory lesion. Postcontrast imaging is recommended for further  assessment.2)  scattered chronic ischemic changes in both hemispheres with volume loss. MRA head was unremarkable. Neuro, Dr. Cohen, recommended outpatient MRI with contrast but patient has iodine allergy.     Her TSH was 1.54, HgA1c 6.4 done 2 weeks ago, Lipid panel with , ratio 3.0. Cardio, Dr. Westfall, to read TTE   done .      Carotid dopplers were negative for any significant stenosis. Admitted to telemetry to tia   rule out CVA.      Weakness.  Plan: -Rule out CVA  possible tia this time as per neuro jones     -CT head negative (noncon, patient has iodine contrast allergy, angio edema)    -MRI/MRA no acute cva noticed     - carotid toppler  no acute stenosis     -echo result as per cardio dr martinez gp     -TSH wnl, ammonia wnl, out patient A1c 6.9 two weeks prior as per pt,    - lipid profile ldl 55 .     -UA with trace LE and mod blood, but patient asymptomatic    -Neuro consulted- Dr. Cohen    -simvastatin 40 mg started    Diabetes mellitus.  -Hold home medications of metformin 1000 mg bid and Glimepiride 2mg bid    -A1C 2 weeks prior as outpatient 6.9    -Start insulin sliding scale, low dose    -accuchecks, hypoglycemia protocol    -hx of diabetic neuropathy, patient gets foot checks q 3 months    -continue gabapentin 1600 mg BID.    : Hypertension.  -BP stable    -hold home Lisinopril 10mg qd    -hold home furosemide 20 mg qd.      Hypothyroid.-Continue Levothyroxine 50 mcg qd    -TSH wnl.     -  presents with altered mental status and weakness sec to possible tia - on asa , statin   fu echo result , pt evaluation   nih score 0 , no flying for 1 week .          The patient should not be flying for 1 week.      pt  symptom resolved  this time ruled out cva  only tia  is diagnosis ,      She was started on Aspirin and her statin   and dvt prophy lovenox was continued.     Her anti-hypertensives were held to allow for permissive hypertension and resumed bp meds at dc.     PT recommended no rehab .         ---    CONSULTANTS:     Neuro: Dr. Cohen    Cardio: Dr. Westfall     --- FROM ADMISSION H+P:     HPI:    75 yo female with PMH of T2DM, Hypothyroid, HTN and Aneurysm (?base of skull/upper neck), hypothyroid, and IBS who presents with altered mental status and weakness. According to patient and family at bedside the patient went for walk this morning and when she returned when family noted that she was lethargic. The patient also states that she was sleeping on the couch, was startled by her son, stood up and felt weak thereafter. Her son also noted that she seemed to be mixing up her words. The patient denies hx of orthostatic BP changes, vertigo, seizures, previous stroke or arrhythmia, trauma, recent illness, or sick contacts.        In the ED, patient's vitals were: T97.7F, Hr 72, /69, RR 14 and SpO2 96% on rom air. Orthostatic negative. CBC and CMP wnl.     CT head showed No evidence of acute hemorrhage mass or mass effect.    EKG: NSR (19 May 2019 17:33)        HOSPITAL COURSE:     She was admitted for weakness likely 2/2 TIA vs. r/o CVA vs. orthostatic hypotension.     CT head negative. 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 A differential includes an area of dysplasia versus a low level inflammatory lesion. Postcontrast imaging is recommended for further  assessment.2)  scattered chronic ischemic changes in both hemispheres with volume loss. MRA head was unremarkable. Neuro, Dr. Cohen, recommended outpatient MRI with contrast but patient has iodine allergy.     Her TSH was 1.54, HgA1c 6.4 done 2 weeks ago, Lipid panel with , ratio 3.0.     Cardio, Dr. Westfall, to read TTE   done . no orthostatic bp change noticed.      Carotid dopplers were negative for any significant stenosis ,      Weakness. Rule out CVA  possible tia this time as   a diagnosis   per neuro jones     -CT head negative (noncon, patient has iodine contrast allergy, angio edema)    -MRI/MRA no acute cva noticed - carotid toppler  no acute stenosis     -echo result as per cardio dr martinez gp     -TSH wnl, ammonia wnl, out patient A1c 6.9 two weeks prior as per pt,    - lipid profile ldl 55 .     -UA with trace LE and mod blood, but patient asymptomatic    -Neuro consulted- Dr. Cohen    -simvastatin 40 mg started  new meds for diag tia / fu out pt repeat lipid profile in 2wk.     Diabetes mellitus. Hold home medications of metformin 1000 mg bid and Glimepiride 2mg bid    -A1C 2 weeks prior as outpatient 6.9    -Start insulin sliding scale, low dose    -accuchecks, hypoglycemia protocol    -hx of diabetic neuropathy, patient gets foot checks q 3 months    -continue gabapentin 1600 mg BID.    : Hypertension.BP stable    -hold home Lisinopril 10mg qd    , furosemide 20 mg qd  , resume at dc .      Hypothyroid.-Continue Levothyroxine 50 mcg qd    -TSH wnl.     -  presents with altered mental status and weakness sec to possible tia - on asa , statin   fu echo result  as per cardio willy  , pt evaluation  pt kept on  Aspirin and her statin   and dvt prophy lovenox was continued.  nih score 0 , no flying for 1 week  clear by neuro dr lindo  to be dc  home .       pt  symptom resolved  this time ruled out cva  only tia  is diagnosis ,           Her anti-hypertensives were held to allow for permissive hypertension and resumed bp meds at [facility].     PT recommended no rehab  / walker ok to dc . pt need fu out pt neuro with  1 to 2 wk as   hx of  brain aneurysm but mra  of brain today does no show  it  this time  .         ---    CONSULTANTS:     Neuro: Dr. Cohen    Cardio: Dr. Westfall     --- FROM ADMISSION H+P:     HPI:    73 yo female with PMH of T2DM, Hypothyroid, HTN and Aneurysm (?base of skull/upper neck), hypothyroid, and IBS who presents with altered mental status and weakness. According to patient and family at bedside the patient went for walk this morning and when she returned when family noted that she was lethargic. The patient also states that she was sleeping on the couch, was startled by her son, stood up and felt weak thereafter. Her son also noted that she seemed to be mixing up her words. The patient denies hx of orthostatic BP changes, vertigo, seizures, previous stroke or arrhythmia, trauma, recent illness, or sick contacts.        In the ED, patient's vitals were: T97.7F, Hr 72, /69, RR 14 and SpO2 96% on rom air. Orthostatic negative. CBC and CMP wnl.     CT head showed No evidence of acute hemorrhage mass or mass effect.    EKG: NSR (19 May 2019 17:33)        HOSPITAL COURSE:     She was admitted for weakness likely 2/2 TIA vs. r/o CVA vs. orthostatic hypotension.     CT head negative. 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 A differential includes an area of dysplasia versus a low level inflammatory lesion. Postcontrast imaging is recommended for further  assessment.2)  scattered chronic ischemic changes in both hemispheres with volume loss. MRA head was unremarkable. Neuro, Dr. Cohen, recommended outpatient MRI with contrast but patient has iodine allergy.     Her TSH was 1.54, HgA1c 6.4 done 2 weeks ago, Lipid panel with , ratio 3.0.     Cardio, Dr. Aguilera, to read TTE   done . no orthostatic bp change noticed so ruled out .      Carotid dopplers were negative for any significant stenosis ,      Weakness. Rule out CVA  possible tia this time as   a diagnosis   per neuro jones     -CT head negative (noncon, patient has iodine contrast allergy, angio edema)    -MRI/MRA no acute cva noticed - carotid toppler  no acute stenosis     -echo result as per cardio dr martinez gp     -TSH wnl, ammonia wnl, out patient A1c 6.9 two weeks prior as per pt,    - lipid profile ldl 55 .     -UA with trace LE and mod blood, but patient asymptomatic    -Neuro consulted- Dr. Cohen    -simvastatin 40 mg started  new meds for diag tia / fu out pt repeat lipid profile in 2wk.     Diabetes mellitus. Hold home medications of metformin 1000 mg bid and Glimepiride 2mg bid    -A1C 2 weeks prior as outpatient 6.9    -Start insulin sliding scale, low dose    -accuchecks, hypoglycemia protocol    -hx of diabetic neuropathy, patient gets foot checks q 3 months    -continue gabapentin 1600 mg BID.    : Hypertension.BP stable    -hold home Lisinopril 10mg qd    , furosemide 20 mg qd  , resume at dc .      Hypothyroid.-Continue Levothyroxine 50 mcg qd    -TSH wnl.     -  presents with altered mental status and weakness sec to possible tia - on asa , statin   fu echo result  as per cardio aguilera  , pt evaluation  pt kept on  Aspirin and her statin   and dvt prophy lovenox was continued.  nih score 0 , no flying for 1 week  clear by neuro dr lindo  to be dc  home .       pt  symptom resolved  this time ruled out cva  only tia  is diagnosis ,           Her anti-hypertensives were held to allow for permissive hypertension and resumed bp meds at dc.     PT recommended no rehab  / walker ok to dc . pt need fu out pt neuro with  1 to 2 wk as   hx of  brain aneurysm but mra  of brain today does no show  it  this time  . pt son aware bed side  aware dc plan .    day of dc physical 5-21-19     physical exam   Vital Signs Last 24 Hrs    T(C): 36.9 (21 May 2019 07:56), Max: 37.2 (20 May 2019 23:25)    T(F): 98.5 (21 May 2019 07:56), Max: 99 (20 May 2019 23:25)    HR: 65 (21 May 2019 07:56) (62 - 76)    BP: 108/70 (21 May 2019 07:56) (98/63 - 155/71)    BP(mean): --    RR: 18 (21 May 2019 07:56) (17 - 18)    SpO2: 96% (21 May 2019 07:56) (93% - 99%)day of dc  GEN no distress , HEENT nt/nc , perrla , CVS s1s2 no tachy , CHEST bl air enter  no wheezing  , no rale  , CNS aao/3  , no focal deficit  , motor 5/5 all 4 ext , GI soft , bs present  , EXT no edema, pedal pulse present , SKIN no rash.        CONSULTANTS:     Neuro: Dr. Cohen    Cardio: Dr. Aguilera     ---

## 2019-05-20 NOTE — OCCUPATIONAL THERAPY INITIAL EVALUATION ADULT - RANGE OF MOTION EXAMINATION, UPPER EXTREMITY
bilateral UE Active ROM was WFL  (within functional limits)/Fine motor skills: WNL's.  Finger to nose: WNL's.

## 2019-05-20 NOTE — OCCUPATIONAL THERAPY INITIAL EVALUATION ADULT - ADDITIONAL COMMENTS
Pt. lives alone in an apartment in [place]. Pt. has no JIGNESH and no steps inside. Pt. has tub with curtains and grab bars. Pt. owns comfort height toilet. Pt. uses sock juwan and reacher, and owns Rollator and Quad Cane. Pt. currently staying at son's house which has no JIGNESH and 4 steps inside +rail. Pt. reports neuropathy in bilateral ankles/feet. Ocular ROM appears intact and no visual field loss noted. Static/Dynamic Sitting Balance: good. Static/Dynamic Standing Balance: fair. UE sensation intact to light touch.

## 2019-05-20 NOTE — DISCHARGE NOTE NURSING/CASE MANAGEMENT/SOCIAL WORK - [id number]_ALL_CORE
Stroke warning signs and symptoms/Stroke support groups for patients, families, and friends/Signs and symptoms of stroke/Prescribed medications/Risk factors for stroke/Stroke education booklet/Call 911 for stroke/Need for follow up after discharge

## 2019-05-20 NOTE — PROGRESS NOTE ADULT - SUBJECTIVE AND OBJECTIVE BOX
Patient is a 74y old  Female who presents with a chief complaint of ams/weakness (20 May 2019 10:01)      HPI:  75 yo female with PMH of T2DM, Hypothyroid, HTN and Aneurysm (?base of skull/upper neck), hypothyroid, and IBS who presents with altered mental status and weakness. According to patient and family at bedside the patient went for walk this morning and when she returned when family noted that she was lethargic. The patient also states that she was sleeping on the couch, was startled by her son, stood up and felt weak thereafter. Her son also noted that she seemed to be mixing up her words. The patient denies hx of orthostatic BP changes, vertigo, seizures, previous stroke or arrhythmia, trauma, recent illness, or sick contacts.    In the ED, patient's vitals were: T97.7F, Hr 72, /69, RR 14 and SpO2 96% on rom air. Orthostatic negative. CBC and CMP wnl.   CT head showed No evidence of acute hemorrhage mass or mass effect.  EKG: NSR (19 May 2019 17:33)      INTERVAL HPI/OVERNIGHT EVENTS: Patient had tension HA overnight, resolved with advil. No acute complaints this AM, patient states that she feels much better than yesterday,    T(C): 36.7 (19 @ 08:02), Max: 36.7 (19 @ 20:50)  HR: 60 (19 @ 08:02) (60 - 72)  BP: 111/68 (19 @ 08:02) (111/68 - 143/64)  RR: 17 (19 @ 08:02) (14 - 18)  SpO2: 98% (19 @ 08:02) (96% - 99%)  Wt(kg): --  I&O's Summary    19 May 2019 07:01  -  20 May 2019 07:00  --------------------------------------------------------  IN: 240 mL / OUT: 0 mL / NET: 240 mL        REVIEW OF SYSTEMS:  CONSTITUTIONAL: No fever, weight loss, or fatigue  EYES: No eye pain, visual disturbances, or discharge  ENMT:  No difficulty hearing, tinnitus, vertigo; No sinus or throat pain  NECK: No pain or stiffness  RESPIRATORY: SCOTT, No cough, wheezing, chills or hemoptysis; No shortness of breath  CARDIOVASCULAR: LE EDEMA, No chest pain, palpitations, dizziness, or leg swelling  GASTROINTESTINAL: No abdominal or epigastric pain. No nausea, vomiting, or hematemesis; No diarrhea or constipation. No melena or hematochezia.  GENITOURINARY: No dysuria, frequency, hematuria, or incontinence  NEUROLOGICAL: LE NEUROPATHY, No headaches, memory loss, loss of strength, numbness, or tremors  SKIN: No itching, burning, rashes, or lesions   LYMPH NODES: No enlarged glands  MUSCULOSKELETAL: No joint pain or swelling; No muscle, back, or extremity pain      PHYSICAL EXAM:  GEBNERAL: obese female, no acute distress, appropriate affect  HEENT: normocephalic atraumatic, PERRLA, EOMI b/l, moist mucous membranes   NECK: supple, nontender, no mass  NEURO: AOx3, CN II-XII grossly intact, strength intact UE and LE b/l, decreased sensation LE b/l.  RESP: clear to auscultation B/L, no wheezes, rales, or rhonchi  CV: regular rate and rhythm, no murmurs, rubs or gallops  ABD/GI: obese, soft, non-tender, non-distended, positive bowel sounds x4  EXTREMITIES trace LE edema, No clubbing, cyanosis positive peripheral pulses  MSK: normal range of motion, no joint erythema or warmth, no joint swelling   SKIN: warm, dry, normal color, well perfused    MEDICATIONS  (STANDING):  aspirin  chewable 81 milliGRAM(s) Oral daily  dextrose 5%. 1000 milliLiter(s) (50 mL/Hr) IV Continuous <Continuous>  dextrose 50% Injectable 12.5 Gram(s) IV Push once  dextrose 50% Injectable 25 Gram(s) IV Push once  dextrose 50% Injectable 25 Gram(s) IV Push once  furosemide    Tablet 20 milliGRAM(s) Oral daily  gabapentin 800 milliGRAM(s) Oral two times a day  insulin lispro (HumaLOG) corrective regimen sliding scale   SubCutaneous three times a day before meals  insulin lispro (HumaLOG) corrective regimen sliding scale   SubCutaneous at bedtime  levothyroxine 50 MICROGram(s) Oral daily  pantoprazole    Tablet 40 milliGRAM(s) Oral before breakfast  simvastatin 40 milliGRAM(s) Oral at bedtime    MEDICATIONS  (PRN):  dextrose 40% Gel 15 Gram(s) Oral once PRN Blood Glucose LESS THAN 70 milliGRAM(s)/deciliter  glucagon  Injectable 1 milliGRAM(s) IntraMuscular once PRN Glucose LESS THAN 70 milligrams/deciliter      LABS:                        11.8   7.66  )-----------( 192      ( 20 May 2019 06:52 )             37.4     05    144  |  108  |  36<H>  ----------------------------<  91  4.5   |  28  |  0.90    Ca    8.4<L>      20 May 2019 06:52    TPro  6.8  /  Alb  3.5  /  TBili  0.2  /  DBili  x   /  AST  14<L>  /  ALT  25  /  AlkPhos  72      PT/INR - ( 19 May 2019 15:32 )   PT: 11.1 sec;   INR: 0.97 ratio         PTT - ( 19 May 2019 15:32 )  PTT:29.4 sec  Urinalysis Basic - ( 19 May 2019 18:44 )    Color: Yellow / Appearance: Clear / S.010 / pH: x  Gluc: x / Ketone: Negative  / Bili: Negative / Urobili: Negative   Blood: x / Protein: Negative / Nitrite: Negative   Leuk Esterase: Moderate / RBC: 0-2 /HPF / WBC 26-50   Sq Epi: x / Non Sq Epi: Occasional / Bacteria: Few      CAPILLARY BLOOD GLUCOSE      POCT Blood Glucose.: 104 mg/dL (20 May 2019 07:54)  POCT Blood Glucose.: 114 mg/dL (19 May 2019 21:37)      RADIOLOGY & ADDITIONAL TESTS:    Imaging Personally Reviewed: Y      Advance Directives: full code      Palliative Care: n/a Patient is a 74y old  Female who presents with a chief complaint of ams/weakness (20 May 2019 10:01)      HPI:  73 yo female with PMH of T2DM, Hypothyroid, HTN and Aneurysm (?base of skull/upper neck), hypothyroid, and IBS who presents with altered mental status and weakness. According to patient and family at bedside the patient went for walk this morning and when she returned when family noted that she was lethargic. The patient also states that she was sleeping on the couch, was startled by her son, stood up and felt weak thereafter. Her son also noted that she seemed to be mixing up her words. The patient denies hx of orthostatic BP changes, vertigo, seizures, previous stroke or arrhythmia, trauma, recent illness, or sick contacts.    In the ED, patient's vitals were: T97.7F, Hr 72, /69, RR 14 and SpO2 96% on rom air. Orthostatic negative. CBC and CMP wnl.   CT head showed No evidence of acute hemorrhage mass or mass effect.  EKG: NSR (19 May 2019 17:33)  admitted with weakness possible tia     INTERVAL HPI/OVERNIGHT EVENTS: Patient had tension HA overnight, resolved with advil. No acute complaints this AM, patient states that she feels much better than yesterday,    T(C): 36.7 (19 @ 08:02), Max: 36.7 (19 @ 20:50)  HR: 60 (19 @ 08:02) (60 - 72)  BP: 111/68 (19 @ 08:02) (111/68 - 143/64)  RR: 17 (19 @ 08:02) (14 - 18)  SpO2: 98% (19 @ 08:02) (96% - 99%)  Wt(kg): --  I&O's Summary    19 May 2019 07:01  -  20 May 2019 07:00  --------------------------------------------------------  IN: 240 mL / OUT: 0 mL / NET: 240 mL        REVIEW OF SYSTEMS:  CONSTITUTIONAL: No fever, weight loss, or fatigue  EYES: No eye pain, visual disturbances, or discharge  ENMT:  No difficulty hearing, tinnitus, vertigo; No sinus or throat pain  NECK: No pain or stiffness  RESPIRATORY: SCOTT, No cough, wheezing, chills No shortness of breath  CARDIOVASCULAR: LE EDEMA, No chest pain, palpitations, dizziness, or leg swelling  GASTROINTESTINAL: No abdominal or epigastric pain. No nausea, vomiting,  No diarrhea or constipation.  GENITOURINARY: No dysuria, frequency, hematuria, or incontinence  NEUROLOGICAL: LE NEUROPATHY, No headaches, memory loss, loss of strength, numbness, or tremors  SKIN: No itching, burning, rashes, or lesions  MUSCULOSKELETAL: No joint pain or swelling; No muscle, back, or extremity pain      PHYSICAL EXAM:  GEBNERAL: no distress   HEENT: PERRLA, nt/nc   EOMI b/l, moist mucous membranes   NECK: supple, nontender, no mass  NEURO: AOx3, CN II-XII grossly intact, strength intact 5/5 all 4 ext   RESP: clear to auscultation B/L, no wheezes, rales, or rhonchi  CV: regular rate and rhythm, no murmurs,  no tachy   ABD/GI: obese, soft, non-tender, non-distended, positive bowel sounds x4  EXTREMITIES trace LE edema, No clubbing, cyanosis positive peripheral pulses  MSK: normal range of motion, no joint erythema or warmth, no joint swelling   SKIN: warm, dry, normal color, well perfused    MEDICATIONS  (STANDING):  aspirin  chewable 81 milliGRAM(s) Oral daily  dextrose 5%. 1000 milliLiter(s) (50 mL/Hr) IV Continuous <Continuous>  dextrose 50% Injectable 12.5 Gram(s) IV Push once  dextrose 50% Injectable 25 Gram(s) IV Push once  dextrose 50% Injectable 25 Gram(s) IV Push once  furosemide    Tablet 20 milliGRAM(s) Oral daily  gabapentin 800 milliGRAM(s) Oral two times a day  insulin lispro (HumaLOG) corrective regimen sliding scale   SubCutaneous three times a day before meals  insulin lispro (HumaLOG) corrective regimen sliding scale   SubCutaneous at bedtime  levothyroxine 50 MICROGram(s) Oral daily  pantoprazole    Tablet 40 milliGRAM(s) Oral before breakfast  simvastatin 40 milliGRAM(s) Oral at bedtime    MEDICATIONS  (PRN):  dextrose 40% Gel 15 Gram(s) Oral once PRN Blood Glucose LESS THAN 70 milliGRAM(s)/deciliter  glucagon  Injectable 1 milliGRAM(s) IntraMuscular once PRN Glucose LESS THAN 70 milligrams/deciliter      LABS:                        11.8   7.66  )-----------( 192      ( 20 May 2019 06:52 )             37.4     05-20    144  |  108  |  36<H>  ----------------------------<  91  4.5   |  28  |  0.90    Ca    8.4<L>      20 May 2019 06:52    TPro  6.8  /  Alb  3.5  /  TBili  0.2  /  DBili  x   /  AST  14<L>  /  ALT  25  /  AlkPhos  72  05-19    PT/INR - ( 19 May 2019 15:32 )   PT: 11.1 sec;   INR: 0.97 ratio         PTT - ( 19 May 2019 15:32 )  PTT:29.4 sec  Urinalysis Basic - ( 19 May 2019 18:44 )    Color: Yellow / Appearance: Clear / S.010 / pH: x  Gluc: x / Ketone: Negative  / Bili: Negative / Urobili: Negative   Blood: x / Protein: Negative / Nitrite: Negative   Leuk Esterase: Moderate / RBC: 0-2 /HPF / WBC 26-50   Sq Epi: x / Non Sq Epi: Occasional / Bacteria: Few      CAPILLARY BLOOD GLUCOSE      POCT Blood Glucose.: 104 mg/dL (20 May 2019 07:54)  POCT Blood Glucose.: 114 mg/dL (19 May 2019 21:37)      RADIOLOGY & ADDITIONAL TESTS:    Imaging Personally Reviewed:    IMPRESSION:      1)  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 A   differential includes an area of dysplasia versus a low level   inflammatory lesion. Postcontrast imaging is recommended for further   assessment..  2)  scattered chronic ischemic changes in both hemispheres with volume   loss..     Advance Directives: full code      Palliative Care: n/a

## 2019-05-20 NOTE — DISCHARGE NOTE PROVIDER - NSDCCPCAREPLAN_GEN_ALL_CORE_FT
PRINCIPAL DISCHARGE DIAGNOSIS  Diagnosis: Weakness  Assessment and Plan of Treatment: You likely had weakness from possible TIA or orthostatic hypotension. Your CT head was negative. You did have focal lesion on MRI brain, which you should follow up with outpatient neurologist. You were started on Aspirin 81mg daily. You should continue your statin. Follow up with your outpatient PCP within 1 week of discharge.      SECONDARY DISCHARGE DIAGNOSES  Diagnosis: Hypothyroid  Assessment and Plan of Treatment: You should continue Synthroid 50mcg daily.    Diagnosis: Diabetes mellitus  Assessment and Plan of Treatment: You should continue your home metformin and glimepiride. For your neuropathy you should continue your gabapentin.    Diagnosis: IBS (irritable bowel syndrome)  Assessment and Plan of Treatment: You should continue home Dicyclomine as needed.    Diagnosis: GERD (gastroesophageal reflux disease)  Assessment and Plan of Treatment: You can resume your PPI.    Diagnosis: Hypertension  Assessment and Plan of Treatment: You should continue your Lisinopril and Lasix. PRINCIPAL DISCHARGE DIAGNOSIS  Diagnosis: Weakness  Assessment and Plan of Treatment: You likely had weakness from possible TIA .Your CT head was negative. You did have focal lesion on MRI brain, which you should follow up with outpatient neurologist. You were started on Aspirin 81mg daily. You should continue your statin. Follow up with your outpatient PCP within 1 week of discharge.      SECONDARY DISCHARGE DIAGNOSES  Diagnosis: GERD (gastroesophageal reflux disease)  Assessment and Plan of Treatment: You can resume your PPI.    Diagnosis: IBS (irritable bowel syndrome)  Assessment and Plan of Treatment: You should continue home Dicyclomine as needed.    Diagnosis: Hypertension  Assessment and Plan of Treatment: You should continue your Lisinopril and Lasix.    Diagnosis: Diabetes mellitus  Assessment and Plan of Treatment: You should continue your home metformin and glimepiride. For your neuropathy you should continue your gabapentin.    Diagnosis: Hypothyroid  Assessment and Plan of Treatment: You should continue Synthroid 50mcg daily. PRINCIPAL DISCHARGE DIAGNOSIS  Diagnosis: Weakness  Assessment and Plan of Treatment: You likely had weakness from possible TIA .Your CT head was negative. You did have focal lesion on MRI brain, which you should follow up with outpatient neurologist. You were started on Aspirin 81mg daily. You should continue your statin. Follow up with your outpatient PCP within 1 week of discharge.      SECONDARY DISCHARGE DIAGNOSES  Diagnosis: GERD (gastroesophageal reflux disease)  Assessment and Plan of Treatment: You can resume your PPI.    Diagnosis: IBS (irritable bowel syndrome)  Assessment and Plan of Treatment: You should continue home Dicyclomine as needed.    Diagnosis: Hypertension  Assessment and Plan of Treatment: You should continue your Lisinopril ,  dc lasix as your bp low side - need to fu closely your bp out pt.    Diagnosis: Diabetes mellitus  Assessment and Plan of Treatment: You should continue your home metformin and glimepiride. For your neuropathy you should continue your gabapentin.    Diagnosis: Hypothyroid  Assessment and Plan of Treatment: You should continue Synthroid 50mcg daily.

## 2019-05-20 NOTE — OCCUPATIONAL THERAPY INITIAL EVALUATION ADULT - PERTINENT HX OF CURRENT PROBLEM, REHAB EVAL
74 year old female admitted with possible TIA and rule out CVA. CT head 5/19 (-) neuro event, MR head 5/20 (+) focal enlargement of amygdala of left temporal lobe. Pt. c/o headache. RN made aware.

## 2019-05-20 NOTE — CONSULT NOTE ADULT - SUBJECTIVE AND OBJECTIVE BOX
[facility] - Good Goode, Luis, David, Jose, Gwyn Westfall  Office Number: [phone]    Initial Consult Note    CHIEF COMPLAINT: Patient is a 74y old  Female who presents with a chief complaint of ams/weakness (20 May 2019 13:11)    HPI: 74F with PMH of DM type 2, hypothyroidism, HTN and possible hx of aneurysm and IBS presents with AMS and weakness. She was mixing up her words and lethargic. Denies any dizziness, CP, SOB, arrhythmia, palpitations. She has no cardiac history and has never seen a cardiologist.     REVIEW OF SYSTEMS: All other review of systems is negative unless indicated above.     PAST MEDICAL & SURGICAL HISTORY:  IBS (irritable bowel syndrome)  Hypothyroidism  Aneurysm artery, neck  Diabetes mellitus  Hypertension  S/P : x4  S/P shoulder replacement, right    SOCIAL HISTORY:  No tobacco, ethanol, or drug abuse.    FAMILY HISTORY:  FH: CAD (coronary artery disease)    No family history of acute MI or sudden cardiac death.    MEDICATIONS  (STANDING):  aspirin  chewable 81 milliGRAM(s) Oral daily  dextrose 5%. 1000 milliLiter(s) (50 mL/Hr) IV Continuous <Continuous>  dextrose 50% Injectable 12.5 Gram(s) IV Push once  dextrose 50% Injectable 25 Gram(s) IV Push once  dextrose 50% Injectable 25 Gram(s) IV Push once  enoxaparin Injectable 40 milliGRAM(s) SubCutaneous daily  furosemide    Tablet 20 milliGRAM(s) Oral daily  gabapentin 800 milliGRAM(s) Oral two times a day  insulin lispro (HumaLOG) corrective regimen sliding scale   SubCutaneous three times a day before meals  insulin lispro (HumaLOG) corrective regimen sliding scale   SubCutaneous at bedtime  levothyroxine 50 MICROGram(s) Oral daily  pantoprazole    Tablet 40 milliGRAM(s) Oral before breakfast  simvastatin 40 milliGRAM(s) Oral at bedtime    MEDICATIONS  (PRN):  dextrose 40% Gel 15 Gram(s) Oral once PRN Blood Glucose LESS THAN 70 milliGRAM(s)/deciliter  glucagon  Injectable 1 milliGRAM(s) IntraMuscular once PRN Glucose LESS THAN 70 milligrams/deciliter    Allergies  adhesives (Rash)  IV Contrast (Short breath    VITAL SIGNS:   Vital Signs Last 24 Hrs  T(C): 36.7 (20 May 2019 12:31), Max: 36.7 (19 May 2019 20:50)  T(F): 98 (20 May 2019 12:31), Max: 98.1 (19 May 2019 20:50)  HR: 73 (20 May 2019 15:02) (60 - 73)  BP: 135/85 (20 May 2019 15:02) (111/68 - 143/64)  BP(mean): 85 (19 May 2019 20:14) (85 - 91)  RR: 17 (20 May 2019 12:31) (16 - 18)  SpO2: 93% (20 May 2019 15:02) (93% - 99%)    I&O's Summary    19 May 2019 07:01  -  20 May 2019 07:00  --------------------------------------------------------  IN: 240 mL / OUT: 0 mL / NET: 240 mL      On Exam:  Constitutional: NAD, alert and oriented x 3  Lungs:  Non-labored, breath sounds are clear bilaterally, No wheezing, rales or rhonchi  Cardiovascular: RRR.  S1 and S2 positive.  No murmurs, rubs, gallops or clicks  Gastrointestinal: Bowel Sounds present, soft, nontender.   Lymph: No peripheral edema. No cervical lymphadenopathy.  Neurological: Alert, no focal deficits  Skin: No rashes or ulcers   Psych:  Mood & affect appropriate.    LABS: All Labs Reviewed:                        11.8   7.66  )-----------( 192      ( 20 May 2019 06:52 )             37.4                         12.8   8.79  )-----------( 200      ( 19 May 2019 15:32 )             40.4     20 May 2019 06:52    144    |  108    |  36     ----------------------------<  91     4.5     |  28     |  0.90   19 May 2019 15:32    142    |  106    |  39     ----------------------------<  109    4.2     |  29     |  0.97     Ca    8.4        20 May 2019 06:52  Ca    8.8        19 May 2019 15:32    TPro  6.8    /  Alb  3.5    /  TBili  0.2    /  DBili  x      /  AST  14     /  ALT  25     /  AlkPhos  72     19 May 2019 15:32    PT/INR - ( 19 May 2019 15:32 )   PT: 11.1 sec;   INR: 0.97 ratio    PTT - ( 19 May 2019 15:32 )  PTT:29.4 sec     @ 06:52  TSH: 1.54    EKG: NSR    RADIOLOGY:     EXAM:  CT BRAIN                        PROCEDURE DATE:  2019        INTERPRETATION:  Clinical indication: Abnormal gait.    Multiple axial sections were performed from base of skull to vertex   without contrast enhancement. Coronal and sagittal reconstructions were   performed as well    Parenchymal volume loss and chronic microvascular ischemic changes are   identified.    There is no evidence of acute hemorrhage mass mass effect in the   posterior fossa or supratentorial region.    Evaluation of the osseous structures with the appropriate window appears   unremarkable    The visualized paranasal sinuses mastoid and middle ear appear clear.    Impression:    No evidence of acute hemorrhage mass or mass effect.        EXAM:  MR BRAIN                        PROCEDURE DATE:  2019        INTERPRETATION:  INDICATION:    Weakness  TECHNIQUE:  Multiplanar brain imaging was conducted using a 1.5 Debra   magnet.  T1, T2 and FLAIR imaging was performed.  In addition, diffusion   imaging, diffusion coefficient assessment (ADC) and echo planar T2* was   incorporated. No contrast administered    COMPARISON EXAMINATION:  CT 2019    FINDINGS:  HEMISPHERES:  There is abnormal signal in the amygdala of the left   temporal lobe on the diffusion study, with the mild enlargement of the   amygdala on the FLAIR imaging. There is no diffusion restriction. A   follow-up MR study with gadolinium is recommended to assess for possible   focal area of dysplasia versus a low level inflammatory condition.   Chronic ischemic changes are scattered in both hemispheres with volume   loss.  VENTRICLES:  midline and normal in size  POSTERIOR FOSSA:  No acute abnormality noted  EXTRA-AXIAL:  No mass or collections are depicted.  CRANIAL NERVES:  No abnormality noted.  VASCULATURE:  The major vessels and venous sinuses are grossly patent.    SINUSES AND MASTOIDS:  Clear.  MISCELLANEOUS:  No orbital or pituitary abnormality noted.  No skull base   lesion suggested.    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 A   differential includes an area of dysplasia versus a low level   inflammatory lesion. Postcontrast imaging is recommended for further   assessment..  2)  scattered chronic ischemic changes in both hemispheres with volume   loss..       EXAM:  MR ANGIO BRAIN                        PROCEDURE DATE:  2019        INTERPRETATION:  INDICATION:  Weakness.    TECHNIQUE:  MR angiography of the brain was performed using three   dimensional time-of-flight (3D-TOF) technique.  Imaging was performed on   a 1.5 Debra magnet.    FINDINGS:      INTERNAL CAROTID ARTERIES:  Bilaterally patent.  No intraluminal filling   defect or dissection seen.    [place] OF JAY:  No aneurysm identified.    CEREBRAL ARTERIES:  Bilaterally patent.  No segmental narrowing or   stenosis.  No changes of vasculopathy.    VERTEBROBASILAR SYSTEM:  No stenosis or occlusion depicted.      IMPRESSION:     Unremarkable study. [facility] - Good Goode, Luis, David, Jose, Gwyn Westfall  Office Number: [phone]    Initial Consult Note    CHIEF COMPLAINT: Patient is a 74y old  Female who presents with a chief complaint of ams/weakness (20 May 2019 13:11)    HPI: 74F with PMH of DM type 2, hypothyroidism, HTN and possible hx of aneurysm and IBS presents with AMS and weakness. She was mixing up her words and lethargic. Denies any dizziness, CP, SOB, arrhythmia, palpitations. She has no cardiac history and has never seen a cardiologist.     REVIEW OF SYSTEMS: All other review of systems is negative unless indicated above.     PAST MEDICAL & SURGICAL HISTORY:  IBS (irritable bowel syndrome)  Hypothyroidism  Aneurysm artery, neck  Diabetes mellitus  Hypertension  S/P : x4  S/P shoulder replacement, right    SOCIAL HISTORY:  No tobacco, ethanol, or drug abuse.    FAMILY HISTORY:  FH: CAD (coronary artery disease)    No family history of acute MI or sudden cardiac death.    MEDICATIONS  (STANDING):  aspirin  chewable 81 milliGRAM(s) Oral daily  dextrose 5%. 1000 milliLiter(s) (50 mL/Hr) IV Continuous <Continuous>  dextrose 50% Injectable 12.5 Gram(s) IV Push once  dextrose 50% Injectable 25 Gram(s) IV Push once  dextrose 50% Injectable 25 Gram(s) IV Push once  enoxaparin Injectable 40 milliGRAM(s) SubCutaneous daily  furosemide    Tablet 20 milliGRAM(s) Oral daily  gabapentin 800 milliGRAM(s) Oral two times a day  insulin lispro (HumaLOG) corrective regimen sliding scale   SubCutaneous three times a day before meals  insulin lispro (HumaLOG) corrective regimen sliding scale   SubCutaneous at bedtime  levothyroxine 50 MICROGram(s) Oral daily  pantoprazole    Tablet 40 milliGRAM(s) Oral before breakfast  simvastatin 40 milliGRAM(s) Oral at bedtime    MEDICATIONS  (PRN):  dextrose 40% Gel 15 Gram(s) Oral once PRN Blood Glucose LESS THAN 70 milliGRAM(s)/deciliter  glucagon  Injectable 1 milliGRAM(s) IntraMuscular once PRN Glucose LESS THAN 70 milligrams/deciliter    Allergies  adhesives (Rash)  IV Contrast (Short breath    VITAL SIGNS:   Vital Signs Last 24 Hrs  T(C): 36.7 (20 May 2019 12:31), Max: 36.7 (19 May 2019 20:50)  T(F): 98 (20 May 2019 12:31), Max: 98.1 (19 May 2019 20:50)  HR: 73 (20 May 2019 15:02) (60 - 73)  BP: 135/85 (20 May 2019 15:02) (111/68 - 143/64)  BP(mean): 85 (19 May 2019 20:14) (85 - 91)  RR: 17 (20 May 2019 12:31) (16 - 18)  SpO2: 93% (20 May 2019 15:02) (93% - 99%)    I&O's Summary    19 May 2019 07:01  -  20 May 2019 07:00  --------------------------------------------------------  IN: 240 mL / OUT: 0 mL / NET: 240 mL      On Exam:  Constitutional: NAD, alert and oriented x 3  Lungs:  Non-labored, breath sounds are clear bilaterally, No wheezing, rales or rhonchi  Cardiovascular: RRR.  S1 and S2 positive.  No murmurs, rubs, gallops or clicks  Gastrointestinal: Bowel Sounds present, soft, nontender.   Lymph: No peripheral edema. No cervical lymphadenopathy.  Neurological: Alert, no focal deficits  Skin: No rashes or ulcers   Psych:  Mood & affect appropriate.    LABS: All Labs Reviewed:                        11.8   7.66  )-----------( 192      ( 20 May 2019 06:52 )             37.4                         12.8   8.79  )-----------( 200      ( 19 May 2019 15:32 )             40.4     20 May 2019 06:52    144    |  108    |  36     ----------------------------<  91     4.5     |  28     |  0.90   19 May 2019 15:32    142    |  106    |  39     ----------------------------<  109    4.2     |  29     |  0.97     Ca    8.4        20 May 2019 06:52  Ca    8.8        19 May 2019 15:32    TPro  6.8    /  Alb  3.5    /  TBili  0.2    /  DBili  x      /  AST  14     /  ALT  25     /  AlkPhos  72     19 May 2019 15:32    PT/INR - ( 19 May 2019 15:32 )   PT: 11.1 sec;   INR: 0.97 ratio    PTT - ( 19 May 2019 15:32 )  PTT:29.4 sec     @ 06:52  TSH: 1.54    EKG: NSR    RADIOLOGY:     EXAM:  CT BRAIN                        PROCEDURE DATE:  2019      INTERPRETATION:  Clinical indication: Abnormal gait.    Multiple axial sections were performed from base of skull to vertex   without contrast enhancement. Coronal and sagittal reconstructions were   performed as well    Parenchymal volume loss and chronic microvascular ischemic changes are   identified.    There is no evidence of acute hemorrhage mass mass effect in the   posterior fossa or supratentorial region.    Evaluation of the osseous structures with the appropriate window appears   unremarkable    The visualized paranasal sinuses mastoid and middle ear appear clear.    Impression:    No evidence of acute hemorrhage mass or mass effect.      EXAM:  MR BRAIN                        PROCEDURE DATE:  2019      INTERPRETATION:  INDICATION:    Weakness  TECHNIQUE:  Multiplanar brain imaging was conducted using a 1.5 Debra   magnet.  T1, T2 and FLAIR imaging was performed.  In addition, diffusion   imaging, diffusion coefficient assessment (ADC) and echo planar T2* was   incorporated. No contrast administered    COMPARISON EXAMINATION:  CT 2019    FINDINGS:  HEMISPHERES:  There is abnormal signal in the amygdala of the left   temporal lobe on the diffusion study, with the mild enlargement of the   amygdala on the FLAIR imaging. There is no diffusion restriction. A   follow-up MR study with gadolinium is recommended to assess for possible   focal area of dysplasia versus a low level inflammatory condition.   Chronic ischemic changes are scattered in both hemispheres with volume   loss.  VENTRICLES:  midline and normal in size  POSTERIOR FOSSA:  No acute abnormality noted  EXTRA-AXIAL:  No mass or collections are depicted.  CRANIAL NERVES:  No abnormality noted.  VASCULATURE:  The major vessels and venous sinuses are grossly patent.    SINUSES AND MASTOIDS:  Clear.  MISCELLANEOUS:  No orbital or pituitary abnormality noted.  No skull base   lesion suggested.    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 A   differential includes an area of dysplasia versus a low level   inflammatory lesion. Postcontrast imaging is recommended for further   assessment..  2)  scattered chronic ischemic changes in both hemispheres with volume   loss..       EXAM:  MR ANGIO BRAIN                        PROCEDURE DATE:  2019      INTERPRETATION:  INDICATION:  Weakness.    TECHNIQUE:  MR angiography of the brain was performed using three   dimensional time-of-flight (3D-TOF) technique.  Imaging was performed on   a 1.5 Debra magnet.    FINDINGS:      INTERNAL CAROTID ARTERIES:  Bilaterally patent.  No intraluminal filling   defect or dissection seen.    [place] OF JAY:  No aneurysm identified.    CEREBRAL ARTERIES:  Bilaterally patent.  No segmental narrowing or   stenosis.  No changes of vasculopathy.    VERTEBROBASILAR SYSTEM:  No stenosis or occlusion depicted.      IMPRESSION:     Unremarkable study.      EXAM:  US DPLX CAROTIDS COMPL BI                        PROCEDURE DATE:  2019      INTERPRETATION:  History: TIA.    Bilateral extracranial carotid Doppler.    Bilateral CCA, ICA, and ECA widely patent. No significant focal plaque   formation or subjective luminal narrowing. Peak systolic flow velocities   in the bilateral CCA, ICA, and ECA fall within normal range. Normal   bilateral ICA/CC systolic velocity ratios. Antegrade flow each vertebral   artery.    Impression: Unremarkable bilateral extracranial carotid Doppler. No   flow-limiting stenosis [facility] - Good Goode, David Smith, Khoi Garcia Savella  Office Number: [phone]    Initial Consult Note    CHIEF COMPLAINT: Patient is a 74y old  Female who presents with a chief complaint of ams/weakness     HPI: 74F with PMH of DM type 2, hypothyroidism, HTN and possible hx of aneurysm and IBS presents with AMS and weakness. She was mixing up her words and lethargic. Sx last for 8 hours and then resolved. Currently is asymptomatic. Denies any chest pain, dyspnea, palpitations, PND, orthopnea, near syncope, syncope, lower extremity edema. She has no cardiac history and has never seen a cardiologist. She lives in [place].     REVIEW OF SYSTEMS: All other review of systems is negative unless indicated above.     PAST MEDICAL & SURGICAL HISTORY:  IBS (irritable bowel syndrome)  Hypothyroidism  Aneurysm artery, neck  Diabetes mellitus  Hypertension  S/P : x4  S/P shoulder replacement, right    SOCIAL HISTORY:  No tobacco, ethanol, or drug abuse.    FAMILY HISTORY:  FH: CAD (coronary artery disease)    No family history of acute MI or sudden cardiac death.    MEDICATIONS  (STANDING):  aspirin  chewable 81 milliGRAM(s) Oral daily  dextrose 5%. 1000 milliLiter(s) (50 mL/Hr) IV Continuous <Continuous>  dextrose 50% Injectable 12.5 Gram(s) IV Push once  dextrose 50% Injectable 25 Gram(s) IV Push once  dextrose 50% Injectable 25 Gram(s) IV Push once  enoxaparin Injectable 40 milliGRAM(s) SubCutaneous daily  furosemide    Tablet 20 milliGRAM(s) Oral daily  gabapentin 800 milliGRAM(s) Oral two times a day  insulin lispro (HumaLOG) corrective regimen sliding scale   SubCutaneous three times a day before meals  insulin lispro (HumaLOG) corrective regimen sliding scale   SubCutaneous at bedtime  levothyroxine 50 MICROGram(s) Oral daily  pantoprazole    Tablet 40 milliGRAM(s) Oral before breakfast  simvastatin 40 milliGRAM(s) Oral at bedtime    MEDICATIONS  (PRN):  dextrose 40% Gel 15 Gram(s) Oral once PRN Blood Glucose LESS THAN 70 milliGRAM(s)/deciliter  glucagon  Injectable 1 milliGRAM(s) IntraMuscular once PRN Glucose LESS THAN 70 milligrams/deciliter    Allergies  adhesives (Rash)  IV Contrast (Short breath    VITAL SIGNS:   Vital Signs Last 24 Hrs  T(C): 36.7 (20 May 2019 12:31), Max: 36.7 (19 May 2019 20:50)  T(F): 98 (20 May 2019 12:31), Max: 98.1 (19 May 2019 20:50)  HR: 73 (20 May 2019 15:02) (60 - 73)  BP: 135/85 (20 May 2019 15:02) (111/68 - 143/64)  BP(mean): 85 (19 May 2019 20:14) (85 - 91)  RR: 17 (20 May 2019 12:31) (16 - 18)  SpO2: 93% (20 May 2019 15:02) (93% - 99%)    I&O's Summary    19 May 2019 07:01  -  20 May 2019 07:00  --------------------------------------------------------  IN: 240 mL / OUT: 0 mL / NET: 240 mL      On Exam:  Constitutional: NAD, alert and oriented x 3  HEENT MMM anicteric  Lungs:  Non-labored, breath sounds are clear bilaterally, No wheezing, rales or rhonchi  Cardiovascular: RRR.  S1 and S2 positive.  No murmurs, rubs, gallops or clicks  Gastrointestinal: Bowel Sounds present, soft, nontender.   Lymph: No peripheral edema. No cervical lymphadenopathy.  Neurological: Alert, no focal deficits  Skin: No rashes or ulcers   Psych:  Mood & affect appropriate.    LABS: All Labs Reviewed:                        11.8   7.66  )-----------( 192      ( 20 May 2019 06:52 )             37.4                         12.8   8.79  )-----------( 200      ( 19 May 2019 15:32 )             40.4     20 May 2019 06:52    144    |  108    |  36     ----------------------------<  91     4.5     |  28     |  0.90   19 May 2019 15:32    142    |  106    |  39     ----------------------------<  109    4.2     |  29     |  0.97     Ca    8.4        20 May 2019 06:52  Ca    8.8        19 May 2019 15:32    TPro  6.8    /  Alb  3.5    /  TBili  0.2    /  DBili  x      /  AST  14     /  ALT  25     /  AlkPhos  72     19 May 2019 15:32    PT/INR - ( 19 May 2019 15:32 )   PT: 11.1 sec;   INR: 0.97 ratio    PTT - ( 19 May 2019 15:32 )  PTT:29.4 sec     @ 06:52  TSH: 1.54    EKG: NSR    RADIOLOGY:     EXAM:  CT BRAIN                        PROCEDURE DATE:  2019      INTERPRETATION:  Clinical indication: Abnormal gait.    Multiple axial sections were performed from base of skull to vertex   without contrast enhancement. Coronal and sagittal reconstructions were   performed as well    Parenchymal volume loss and chronic microvascular ischemic changes are   identified.    There is no evidence of acute hemorrhage mass mass effect in the   posterior fossa or supratentorial region.    Evaluation of the osseous structures with the appropriate window appears   unremarkable    The visualized paranasal sinuses mastoid and middle ear appear clear.    Impression:    No evidence of acute hemorrhage mass or mass effect.      EXAM:  MR BRAIN                        PROCEDURE DATE:  2019      INTERPRETATION:  INDICATION:    Weakness  TECHNIQUE:  Multiplanar brain imaging was conducted using a 1.5 Debra   magnet.  T1, T2 and FLAIR imaging was performed.  In addition, diffusion   imaging, diffusion coefficient assessment (ADC) and echo planar T2* was   incorporated. No contrast administered    COMPARISON EXAMINATION:  CT 2019    FINDINGS:  HEMISPHERES:  There is abnormal signal in the amygdala of the left   temporal lobe on the diffusion study, with the mild enlargement of the   amygdala on the FLAIR imaging. There is no diffusion restriction. A   follow-up MR study with gadolinium is recommended to assess for possible   focal area of dysplasia versus a low level inflammatory condition.   Chronic ischemic changes are scattered in both hemispheres with volume   loss.  VENTRICLES:  midline and normal in size  POSTERIOR FOSSA:  No acute abnormality noted  EXTRA-AXIAL:  No mass or collections are depicted.  CRANIAL NERVES:  No abnormality noted.  VASCULATURE:  The major vessels and venous sinuses are grossly patent.    SINUSES AND MASTOIDS:  Clear.  MISCELLANEOUS:  No orbital or pituitary abnormality noted.  No skull base   lesion suggested.    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 A   differential includes an area of dysplasia versus a low level   inflammatory lesion. Postcontrast imaging is recommended for further   assessment..  2)  scattered chronic ischemic changes in both hemispheres with volume   loss..       EXAM:  MR ANGIO BRAIN                        PROCEDURE DATE:  2019      INTERPRETATION:  INDICATION:  Weakness.    TECHNIQUE:  MR angiography of the brain was performed using three   dimensional time-of-flight (3D-TOF) technique.  Imaging was performed on   a 1.5 Debra magnet.    FINDINGS:      INTERNAL CAROTID ARTERIES:  Bilaterally patent.  No intraluminal filling   defect or dissection seen.    [place] OF JAY:  No aneurysm identified.    CEREBRAL ARTERIES:  Bilaterally patent.  No segmental narrowing or   stenosis.  No changes of vasculopathy.    VERTEBROBASILAR SYSTEM:  No stenosis or occlusion depicted.      IMPRESSION:     Unremarkable study.      EXAM:  US DPLX CAROTIDS COMPL BI                        PROCEDURE DATE:  2019      INTERPRETATION:  History: TIA.    Bilateral extracranial carotid Doppler.    Bilateral CCA, ICA, and ECA widely patent. No significant focal plaque   formation or subjective luminal narrowing. Peak systolic flow velocities   in the bilateral CCA, ICA, and ECA fall within normal range. Normal   bilateral ICA/CC systolic velocity ratios. Antegrade flow each vertebral   artery.    Impression: Unremarkable bilateral extracranial carotid Doppler. No   flow-limiting stenosis

## 2019-05-20 NOTE — DISCHARGE NOTE PROVIDER - CARE PROVIDER_API CALL
Amalia Pedroza  Phone: [phone]  Fax: (   )    -  Follow Up Time: 2 weeks Amalia Pedroza  Phone: [phone]  Fax: (   )    -  Follow Up Time: 2 weeks    Yayo Hatch)  Neurology  [address] [place] [address]  Phone: [phone]  Fax: [phone]  Follow Up Time:

## 2019-05-20 NOTE — PHYSICAL THERAPY INITIAL EVALUATION ADULT - PERTINENT HX OF CURRENT PROBLEM, REHAB EVAL
73 y/o female adm 5/19 with altered mental status and weakness. CT head: negative. MRI brain: no CVA, + L temporal lobe amygdala enlargement.

## 2019-05-21 VITALS
SYSTOLIC BLOOD PRESSURE: 108 MMHG | OXYGEN SATURATION: 96 % | TEMPERATURE: 98 F | RESPIRATION RATE: 18 BRPM | HEART RATE: 65 BPM | DIASTOLIC BLOOD PRESSURE: 70 MMHG

## 2019-05-21 PROCEDURE — 70551 MRI BRAIN STEM W/O DYE: CPT

## 2019-05-21 PROCEDURE — 84443 ASSAY THYROID STIM HORMONE: CPT

## 2019-05-21 PROCEDURE — 80048 BASIC METABOLIC PNL TOTAL CA: CPT

## 2019-05-21 PROCEDURE — 82140 ASSAY OF AMMONIA: CPT

## 2019-05-21 PROCEDURE — 97530 THERAPEUTIC ACTIVITIES: CPT

## 2019-05-21 PROCEDURE — 80061 LIPID PANEL: CPT

## 2019-05-21 PROCEDURE — 36415 COLL VENOUS BLD VENIPUNCTURE: CPT

## 2019-05-21 PROCEDURE — 99239 HOSP IP/OBS DSCHRG MGMT >30: CPT

## 2019-05-21 PROCEDURE — 97116 GAIT TRAINING THERAPY: CPT

## 2019-05-21 PROCEDURE — 82962 GLUCOSE BLOOD TEST: CPT

## 2019-05-21 PROCEDURE — 85027 COMPLETE CBC AUTOMATED: CPT

## 2019-05-21 PROCEDURE — 97535 SELF CARE MNGMENT TRAINING: CPT

## 2019-05-21 PROCEDURE — 97161 PT EVAL LOW COMPLEX 20 MIN: CPT

## 2019-05-21 PROCEDURE — 93306 TTE W/DOPPLER COMPLETE: CPT

## 2019-05-21 PROCEDURE — 99232 SBSQ HOSP IP/OBS MODERATE 35: CPT

## 2019-05-21 PROCEDURE — 87086 URINE CULTURE/COLONY COUNT: CPT

## 2019-05-21 PROCEDURE — 70544 MR ANGIOGRAPHY HEAD W/O DYE: CPT

## 2019-05-21 PROCEDURE — 99285 EMERGENCY DEPT VISIT HI MDM: CPT | Mod: 25

## 2019-05-21 PROCEDURE — 93005 ELECTROCARDIOGRAM TRACING: CPT

## 2019-05-21 PROCEDURE — 93880 EXTRACRANIAL BILAT STUDY: CPT

## 2019-05-21 PROCEDURE — 81001 URINALYSIS AUTO W/SCOPE: CPT

## 2019-05-21 PROCEDURE — 70450 CT HEAD/BRAIN W/O DYE: CPT

## 2019-05-21 PROCEDURE — 85610 PROTHROMBIN TIME: CPT

## 2019-05-21 PROCEDURE — 97166 OT EVAL MOD COMPLEX 45 MIN: CPT

## 2019-05-21 PROCEDURE — 85730 THROMBOPLASTIN TIME PARTIAL: CPT

## 2019-05-21 PROCEDURE — 87040 BLOOD CULTURE FOR BACTERIA: CPT

## 2019-05-21 PROCEDURE — 80053 COMPREHEN METABOLIC PANEL: CPT

## 2019-05-21 RX ORDER — SIMVASTATIN 20 MG/1
1 TABLET, FILM COATED ORAL
Qty: 14 | Refills: 0
Start: 2019-05-21 | End: 2019-06-03

## 2019-05-21 RX ORDER — SIMVASTATIN 20 MG/1
20 TABLET, FILM COATED ORAL AT BEDTIME
Refills: 0 | Status: DISCONTINUED | OUTPATIENT
Start: 2019-05-21 | End: 2019-05-21

## 2019-05-21 RX ORDER — LISINOPRIL 2.5 MG/1
10 TABLET ORAL DAILY
Refills: 0 | Status: DISCONTINUED | OUTPATIENT
Start: 2019-05-21 | End: 2019-05-21

## 2019-05-21 RX ORDER — FUROSEMIDE 40 MG
1 TABLET ORAL
Qty: 0 | Refills: 0 | DISCHARGE

## 2019-05-21 RX ADMIN — PANTOPRAZOLE SODIUM 40 MILLIGRAM(S): 20 TABLET, DELAYED RELEASE ORAL at 05:32

## 2019-05-21 RX ADMIN — GABAPENTIN 800 MILLIGRAM(S): 400 CAPSULE ORAL at 05:32

## 2019-05-21 RX ADMIN — Medication 20 MILLIGRAM(S): at 05:32

## 2019-05-21 RX ADMIN — ENOXAPARIN SODIUM 40 MILLIGRAM(S): 100 INJECTION SUBCUTANEOUS at 11:01

## 2019-05-21 RX ADMIN — Medication 50 MICROGRAM(S): at 05:32

## 2019-05-21 RX ADMIN — Medication 81 MILLIGRAM(S): at 11:01

## 2019-05-21 NOTE — PROGRESS NOTE ADULT - SUBJECTIVE AND OBJECTIVE BOX
[facility]    Good Goode, Luis, David, Jose, Khoi, Gwyn      [phone]    CHIEF COMPLAINT: Patient is a 74y old  Female who presents with a chief complaint of AMS/Weakness (20 May 2019 15:14)      Follow Up: altered ms, thn    Interim history: The patient reports no new symptoms.  Denies chest discomfort and shortness of breath.  No abdominal pain.  No new neurologic symptoms.      MEDICATIONS  (STANDING):  aspirin  chewable 81 milliGRAM(s) Oral daily  dextrose 5%. 1000 milliLiter(s) (50 mL/Hr) IV Continuous <Continuous>  dextrose 50% Injectable 12.5 Gram(s) IV Push once  dextrose 50% Injectable 25 Gram(s) IV Push once  dextrose 50% Injectable 25 Gram(s) IV Push once  enoxaparin Injectable 40 milliGRAM(s) SubCutaneous daily  furosemide    Tablet 20 milliGRAM(s) Oral daily  gabapentin 800 milliGRAM(s) Oral two times a day  insulin lispro (HumaLOG) corrective regimen sliding scale   SubCutaneous three times a day before meals  insulin lispro (HumaLOG) corrective regimen sliding scale   SubCutaneous at bedtime  levothyroxine 50 MICROGram(s) Oral daily  lisinopril 10 milliGRAM(s) Oral daily  pantoprazole    Tablet 40 milliGRAM(s) Oral before breakfast  simvastatin 20 milliGRAM(s) Oral at bedtime    MEDICATIONS  (PRN):  dextrose 40% Gel 15 Gram(s) Oral once PRN Blood Glucose LESS THAN 70 milliGRAM(s)/deciliter  glucagon  Injectable 1 milliGRAM(s) IntraMuscular once PRN Glucose LESS THAN 70 milligrams/deciliter      REVIEW OF SYSTEMS:  eye, ent, GI, , allergic, dermatologic, musculoskeletal and neurologic are negative except as described above    Vital Signs Last 24 Hrs  T(C): 36.9 (21 May 2019 07:56), Max: 37.2 (20 May 2019 23:25)  T(F): 98.5 (21 May 2019 07:56), Max: 99 (20 May 2019 23:25)  HR: 65 (21 May 2019 07:56) (62 - 76)  BP: 108/70 (21 May 2019 07:56) (98/63 - 155/71)  BP(mean): --  RR: 18 (21 May 2019 07:56) (18 - 18)  SpO2: 96% (21 May 2019 07:56) (93% - 99%)    I&O's Summary    20 May 2019 07:01  -  21 May 2019 07:00  --------------------------------------------------------  IN: 240 mL / OUT: 0 mL / NET: 240 mL    21 May 2019 07:01  -  21 May 2019 14:07  --------------------------------------------------------  IN: 100 mL / OUT: 0 mL / NET: 100 mL        Telemetry past 24h:    PHYSICAL EXAM:    Constitutional: obese NAD   HEENT:  MMM, sclerae anicteric, conjunctivae clear, no oral cyanosis.  Pulmonary: Non-labored, breath sounds are clear bilaterally, No wheezing, rales or rhonchi  Cardiovascular: Regular, S1 and S2.  No murmur.  No rubs, gallops or clicks  Gastrointestinal: Bowel Sounds present, soft, nontender.   Lymph: No peripheral edema.   Neurological: Alert, no focal deficits  Skin: No rashes.  Psych:  Mood & affect appropriate    LABS: All Labs Reviewed:                        11.8   7.66  )-----------( 192      ( 20 May 2019 06:52 )             37.4                         12.8   8.79  )-----------( 200      ( 19 May 2019 15:32 )             40.4     20 May 2019 06:52    144    |  108    |  36     ----------------------------<  91     4.5     |  28     |  0.90   19 May 2019 15:32    142    |  106    |  39     ----------------------------<  109    4.2     |  29     |  0.97     Ca    8.4        20 May 2019 06:52  Ca    8.8        19 May 2019 15:32    TPro  6.8    /  Alb  3.5    /  TBili  0.2    /  DBili  x      /  AST  14     /  ALT  25     /  AlkPhos  72     19 May 2019 15:32    PT/INR - ( 19 May 2019 15:32 )   PT: 11.1 sec;   INR: 0.97 ratio         PTT - ( 19 May 2019 15:32 )  PTT:29.4 sec      Blood Culture: Organism --  Gram Stain Blood -- Gram Stain --  Specimen Source .Blood Blood-Peripheral  Culture-Blood --    Organism --  Gram Stain Blood -- Gram Stain --  Specimen Source .Urine Clean Catch (Midstream)  Culture-Blood --        05-20 @ 06:52  TSH: 1.54      RADIOLOGY:    EKG:    Echo:

## 2019-05-21 NOTE — PROGRESS NOTE ADULT - ASSESSMENT
-there is no evidence of acute ischemia.  -there is no evidence of significant arrhythmia.  -there is no evidence for meaningful  volume overload.  -bp controlled adequately  -DVT prophylaxis  -monitor electrolytes, keep k>4, Mg>2  -cardiovascular status is otherwise without acute change.  -dc planning for today

## 2019-05-25 LAB
CULTURE RESULTS: SIGNIFICANT CHANGE UP
CULTURE RESULTS: SIGNIFICANT CHANGE UP
SPECIMEN SOURCE: SIGNIFICANT CHANGE UP
SPECIMEN SOURCE: SIGNIFICANT CHANGE UP

## 2020-02-17 NOTE — PATIENT PROFILE ADULT - NSTOBACCONEVERSMOKERY/N_GEN_A
Reason for Admission:   Admitted from home with complaints of body aches, chills and sweats. RUR Score:     7%               Plan for utilizing home health:    Anticipate that she will need IV antibiotics at discharge. Awaiting results of TTE to determine duration of therapy. Current Advanced Directive/Advance Care Plan: AMD copy on chart. Transition of Care Plan:   Awaiting results of TTE scheduled for today  Will need PICC placed for long-term antibiotics      Patient lives at home with  and is independent with all ADLs. Spoke with both at the bedside and they would like to have home IV therapy vs OPIC.     Care Management Interventions  PCP Verified by CM: Yes(Dr Kassandra Browning)  Palliative Care Criteria Met (RRAT>21 & CHF Dx)?: No  Mode of Transport at Discharge: (private car)  Current Support Network: Lives with Spouse, Own Home  The Plan for Transition of Care is Related to the Following Treatment Goals : (patient will discharge home on IV antibiotics)  The Patient and/or Patient Representative was Provided with a Choice of Provider and Agrees with the Discharge Plan?: (Arlen Lozano )  [address] Provided?: No    Robbin Spring RN CRM  Ext [phone] No

## 2020-07-25 ENCOUNTER — TELEPHONE ENCOUNTER (OUTPATIENT)
Dept: URBAN - METROPOLITAN AREA CLINIC 13 | Facility: CLINIC | Age: 75
End: 2020-07-25

## 2020-07-26 ENCOUNTER — TELEPHONE ENCOUNTER (OUTPATIENT)
Dept: URBAN - METROPOLITAN AREA CLINIC 13 | Facility: CLINIC | Age: 75
End: 2020-07-26

## 2021-06-15 NOTE — PATIENT PROFILE ADULT - NSPROPTRIGHTBILLOFRIGHTS_GEN_A_NUR
Clinic Care Coordination Contact  Patient stated that she is good and she did not need anything at this time.    patient

## 2021-10-12 NOTE — PHYSICAL THERAPY INITIAL EVALUATION ADULT - DISCHARGE PLANNER MADE AWARE
yes Prednisone Counseling:  I discussed with the patient the risks of prolonged use of prednisone including but not limited to weight gain, insomnia, osteoporosis, mood changes, diabetes, susceptibility to infection, glaucoma and high blood pressure.  In cases where prednisone use is prolonged, patients should be monitored with blood pressure checks, serum glucose levels and an eye exam.  Additionally, the patient may need to be placed on GI prophylaxis, PCP prophylaxis, and calcium and vitamin D supplementation and/or a bisphosphonate.  The patient verbalized understanding of the proper use and the possible adverse effects of prednisone.  All of the patient's questions and concerns were addressed.

## 2022-06-25 RX ORDER — OMEPRAZOLE 40 MG/1
CAPSULE, DELAYED RELEASE ORAL
COMMUNITY
End: 2022-10-19 | Stop reason: SDUPTHER

## 2022-06-25 RX ORDER — GUAIFENESIN 600 MG/1
TABLET, EXTENDED RELEASE ORAL
COMMUNITY
End: 2022-10-02

## 2022-06-25 RX ORDER — GLIMEPIRIDE 2 MG/1
TABLET ORAL
COMMUNITY
End: 2022-10-02

## 2022-06-25 RX ORDER — LEVOTHYROXINE SODIUM 0.05 MG/1
TABLET ORAL
COMMUNITY
End: 2022-10-19 | Stop reason: SDUPTHER

## 2022-06-25 RX ORDER — FUROSEMIDE 20 MG/1
TABLET ORAL
COMMUNITY
End: 2022-10-02

## 2022-06-25 RX ORDER — NAPROXEN 500 MG/1
TABLET ORAL
COMMUNITY
End: 2022-10-02

## 2022-06-25 RX ORDER — CHLORPHENIRAMINE MALEATE 4 MG/1
TABLET ORAL
COMMUNITY
End: 2022-10-02

## 2022-06-25 RX ORDER — ONDANSETRON 4 MG/1
TABLET, FILM COATED ORAL
COMMUNITY

## 2022-06-25 RX ORDER — GABAPENTIN 600 MG/1
TABLET ORAL
COMMUNITY
End: 2022-10-02

## 2022-06-25 RX ORDER — TRAZODONE HYDROCHLORIDE 100 MG/1
TABLET ORAL
COMMUNITY
End: 2022-10-19 | Stop reason: SDUPTHER

## 2022-06-25 RX ORDER — MELOXICAM 15 MG/1
TABLET ORAL
COMMUNITY
End: 2022-10-02

## 2022-06-25 RX ORDER — SIMVASTATIN 20 MG
TABLET ORAL
COMMUNITY
End: 2022-10-02

## 2022-06-25 RX ORDER — HYDROCODONE BITARTRATE AND ACETAMINOPHEN 7.5; 325 MG/1; MG/1
TABLET ORAL
COMMUNITY
End: 2022-10-02

## 2022-06-25 RX ORDER — IPRATROPIUM BROMIDE 42 UG/1
SPRAY, METERED NASAL
COMMUNITY
End: 2022-10-02

## 2022-06-25 RX ORDER — PSEUDOEPHEDRINE HCL 30 MG
TABLET ORAL
COMMUNITY
End: 2022-10-02

## 2022-06-25 RX ORDER — DONEPEZIL HYDROCHLORIDE 10 MG/1
TABLET, FILM COATED ORAL
COMMUNITY

## 2022-06-25 RX ORDER — LISINOPRIL 10 MG/1
TABLET ORAL
COMMUNITY
End: 2022-10-02

## 2022-06-25 RX ORDER — TIZANIDINE 4 MG/1
TABLET ORAL
COMMUNITY
End: 2022-10-02

## 2022-06-25 RX ORDER — MEMANTINE HYDROCHLORIDE 5 MG/1
TABLET ORAL
COMMUNITY

## 2022-06-25 RX ORDER — VENLAFAXINE HYDROCHLORIDE 150 MG/1
CAPSULE, EXTENDED RELEASE ORAL
COMMUNITY
End: 2022-10-02

## 2022-10-02 PROBLEM — E03.9 ACQUIRED HYPOTHYROIDISM: Status: ACTIVE | Noted: 2022-10-02
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
## 2022-10-02 PROBLEM — G89.29 OTHER CHRONIC PAIN: Status: ACTIVE | Noted: 2022-10-02

## 2022-10-02 PROBLEM — G47.30 SLEEP APNEA IN ADULT: Status: ACTIVE | Noted: 2022-10-02

## 2022-10-02 PROBLEM — N20.0 CALCULUS OF KIDNEY: Status: ACTIVE | Noted: 2022-10-02

## 2022-10-02 PROBLEM — R11.0 NAUSEA: Status: ACTIVE | Noted: 2022-10-02

## 2022-10-02 PROBLEM — M70.61 GREATER TROCHANTERIC BURSITIS OF RIGHT HIP: Status: ACTIVE | Noted: 2022-10-02

## 2022-10-02 PROBLEM — M20.40 HAMMER TOE: Status: ACTIVE | Noted: 2022-10-02

## 2022-10-02 PROBLEM — F41.9 ANXIETY: Status: ACTIVE | Noted: 2022-10-02

## 2022-10-02 PROBLEM — E53.8 VITAMIN B12 DEFICIENCY: Status: ACTIVE | Noted: 2022-10-02

## 2022-10-02 PROBLEM — Z90.5 HISTORY OF LEFT NEPHRECTOMY: Status: ACTIVE | Noted: 2022-10-02

## 2022-10-02 PROBLEM — R53.1 WEAKNESS: Status: ACTIVE | Noted: 2022-10-02

## 2022-10-02 PROBLEM — S83.231A COMPLEX TEAR OF MEDIAL MENISCUS OF RIGHT KNEE AS CURRENT INJURY: Status: ACTIVE | Noted: 2022-10-02

## 2022-10-02 PROBLEM — K58.0 IRRITABLE BOWEL SYNDROME WITH DIARRHEA: Status: ACTIVE | Noted: 2022-10-02

## 2022-10-02 PROBLEM — E11.65 TYPE 2 DIABETES MELLITUS WITH HYPERGLYCEMIA, WITHOUT LONG-TERM CURRENT USE OF INSULIN (HCC): Status: ACTIVE | Noted: 2022-10-02

## 2022-10-02 PROBLEM — I63.9 CEREBROVASCULAR ACCIDENT (CVA) (HCC): Status: ACTIVE | Noted: 2022-10-02

## 2022-10-02 PROBLEM — I10 ESSENTIAL HYPERTENSION: Status: ACTIVE | Noted: 2022-10-02

## 2022-10-02 PROBLEM — M54.50 RIGHT LOW BACK PAIN: Status: ACTIVE | Noted: 2022-10-02

## 2022-10-02 PROBLEM — E78.5 HYPERLIPIDEMIA LDL GOAL <100: Status: ACTIVE | Noted: 2022-10-02

## 2022-10-02 PROBLEM — H91.90 IMPAIRED HEARING: Status: ACTIVE | Noted: 2022-10-02

## 2022-10-02 PROBLEM — M25.561 RIGHT KNEE PAIN: Status: ACTIVE | Noted: 2022-10-02

## 2022-10-02 PROBLEM — R41.3 MEMORY LOSS: Status: ACTIVE | Noted: 2022-10-02

## 2022-10-10 NOTE — PATIENT PROFILE ADULT - FUNCTIONAL SCREEN CURRENT LEVEL: COMMUNICATION, MLM
Patient called for Femara 5mg refill. Cycle day 1 started yesterday (10-9-2022). I assume she needs usn day 10-12 and f/u with that?
0 = understands/communicates without difficulty

## 2022-10-19 PROBLEM — F51.01 PRIMARY INSOMNIA: Status: ACTIVE | Noted: 2022-10-19

## 2022-10-19 PROBLEM — K21.9 GASTROESOPHAGEAL REFLUX DISEASE WITHOUT ESOPHAGITIS: Status: ACTIVE | Noted: 2022-10-19

## 2022-10-19 PROBLEM — M54.16 LUMBAR RADICULOPATHY, CHRONIC: Status: ACTIVE | Noted: 2022-10-19
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
## 2023-03-06 NOTE — ED ADULT NURSE NOTE - NSFALLRSKASSESSTYPE_ED_ALL_ED
Patient needs refill to go to [facility] on [address]. Initial (On Arrival)

## 2023-03-22 NOTE — ED PROVIDER NOTE - GASTROINTESTINAL, MLM
Patient had wean down on his sertraline from 100 mg to 50 mg.  I do wonder if his anxiety is escalated from the lower dosing.  I would go back up to 100 mg daily until he sees me and then we can always talk about adjusting her adding a different medication.  Should continue working and see me at his scheduled visit to review his concern per  former message;     Abdomen soft, obese, non-tender, no guarding.

## 2024-09-03 ENCOUNTER — NEW PATIENT (OUTPATIENT)
Facility: LOCATION | Age: 79
End: 2024-09-03

## 2024-09-03 DIAGNOSIS — H43.811: ICD-10-CM

## 2024-09-03 DIAGNOSIS — H04.123: ICD-10-CM

## 2024-09-03 DIAGNOSIS — H26.493: ICD-10-CM

## 2024-09-03 DIAGNOSIS — E11.9: ICD-10-CM

## 2024-09-03 PROCEDURE — 92004 COMPRE OPH EXAM NEW PT 1/>: CPT

## 2024-09-03 PROCEDURE — 92015 DETERMINE REFRACTIVE STATE: CPT

## 2024-09-11 ENCOUNTER — SURGERY/PROCEDURE (OUTPATIENT)
Dept: URBAN - METROPOLITAN AREA SURGERY 6 | Facility: SURGERY | Age: 79
End: 2024-09-11

## 2024-09-11 DIAGNOSIS — H26.493: ICD-10-CM

## 2024-09-11 PROCEDURE — 66821 AFTER CATARACT LASER SURGERY: CPT

## 2024-09-17 ENCOUNTER — POST-OP (OUTPATIENT)
Facility: LOCATION | Age: 79
End: 2024-09-17

## 2024-09-17 DIAGNOSIS — Z98.890: ICD-10-CM
